# Patient Record
Sex: MALE | Race: WHITE | NOT HISPANIC OR LATINO | URBAN - METROPOLITAN AREA
[De-identification: names, ages, dates, MRNs, and addresses within clinical notes are randomized per-mention and may not be internally consistent; named-entity substitution may affect disease eponyms.]

---

## 2020-09-27 ENCOUNTER — OFFICE VISIT (OUTPATIENT)
Dept: URGENT CARE | Facility: CLINIC | Age: 40
End: 2020-09-27
Payer: COMMERCIAL

## 2020-09-27 VITALS
HEIGHT: 72 IN | RESPIRATION RATE: 18 BRPM | WEIGHT: 190 LBS | BODY MASS INDEX: 25.73 KG/M2 | OXYGEN SATURATION: 98 % | TEMPERATURE: 97 F | SYSTOLIC BLOOD PRESSURE: 127 MMHG | DIASTOLIC BLOOD PRESSURE: 78 MMHG | HEART RATE: 97 BPM

## 2020-09-27 DIAGNOSIS — Z23 IMMUNIZATION DUE: Primary | ICD-10-CM

## 2020-09-27 DIAGNOSIS — T14.8XXA PUNCTURE WOUND: ICD-10-CM

## 2020-09-27 PROCEDURE — 90471 IMMUNIZATION ADMIN: CPT | Mod: S$GLB,,, | Performed by: EMERGENCY MEDICINE

## 2020-09-27 PROCEDURE — 90471 TDAP VACCINE GREATER THAN OR EQUAL TO 7YO IM: ICD-10-PCS | Mod: S$GLB,,, | Performed by: EMERGENCY MEDICINE

## 2020-09-27 PROCEDURE — 90715 TDAP VACCINE 7 YRS/> IM: CPT | Mod: S$GLB,,, | Performed by: EMERGENCY MEDICINE

## 2020-09-27 PROCEDURE — 99203 OFFICE O/P NEW LOW 30 MIN: CPT | Mod: 25,S$GLB,, | Performed by: NURSE PRACTITIONER

## 2020-09-27 PROCEDURE — 99203 PR OFFICE/OUTPT VISIT, NEW, LEVL III, 30-44 MIN: ICD-10-PCS | Mod: 25,S$GLB,, | Performed by: NURSE PRACTITIONER

## 2020-09-27 PROCEDURE — 90715 TDAP VACCINE GREATER THAN OR EQUAL TO 7YO IM: ICD-10-PCS | Mod: S$GLB,,, | Performed by: EMERGENCY MEDICINE

## 2020-09-27 RX ORDER — LITHIUM CARBONATE 300 MG
300 TABLET ORAL DAILY
COMMUNITY
Start: 2020-08-25 | End: 2024-04-01

## 2020-09-27 RX ORDER — PANTOPRAZOLE SODIUM 40 MG/1
TABLET, DELAYED RELEASE ORAL
COMMUNITY
Start: 2020-09-25 | End: 2024-04-01

## 2020-09-27 RX ORDER — BUPROPION HYDROCHLORIDE 300 MG/1
300 TABLET ORAL DAILY
COMMUNITY
Start: 2020-08-25

## 2020-09-27 RX ORDER — MUPIROCIN 20 MG/G
OINTMENT TOPICAL 2 TIMES DAILY
Qty: 1 TUBE | Refills: 0 | Status: SHIPPED | OUTPATIENT
Start: 2020-09-27 | End: 2020-10-07

## 2020-09-27 NOTE — PROGRESS NOTES
Subjective:       Patient ID: Rafael Smith is a 40 y.o. male.    Vitals:  height is 6' (1.829 m) and weight is 86.2 kg (190 lb). His temperature is 97 °F (36.1 °C). His blood pressure is 127/78 and his pulse is 97. His respiration is 18 and oxygen saturation is 98%.     Chief Complaint: Puncture Wound    Patient states very light puncture from a august shelf at the bottom of AC unit  States he scraped it and needs tdap updated      Constitution: Negative for chills, fatigue and fever.   HENT: Negative for congestion and sore throat.    Neck: Negative for painful lymph nodes.   Cardiovascular: Negative for chest pain and leg swelling.   Eyes: Negative for double vision and blurred vision.   Respiratory: Negative for cough and shortness of breath.    Gastrointestinal: Negative for nausea, vomiting and diarrhea.   Genitourinary: Negative for dysuria, frequency and urgency.   Musculoskeletal: Negative for joint pain, joint swelling, muscle cramps and muscle ache.   Skin: Positive for puncture wound. Negative for color change, pale, rash and erythema.   Allergic/Immunologic: Negative for seasonal allergies.   Neurological: Negative for dizziness, history of vertigo, light-headedness, passing out and headaches.   Hematologic/Lymphatic: Negative for swollen lymph nodes, easy bruising/bleeding and history of blood clots. Does not bruise/bleed easily.   Psychiatric/Behavioral: Negative for nervous/anxious, sleep disturbance and depression. The patient is not nervous/anxious.        Objective:      Physical Exam   Constitutional: He is oriented to person, place, and time. He appears well-developed.   HENT:   Head: Normocephalic and atraumatic. Head is without abrasion, without contusion and without laceration.   Ears:   Right Ear: External ear normal.   Left Ear: External ear normal.   Nose: Nose normal.   Mouth/Throat: Oropharynx is clear and moist and mucous membranes are normal.   Eyes: Pupils are equal, round, and  reactive to light. Conjunctivae, EOM and lids are normal.   Neck: Trachea normal, full passive range of motion without pain and phonation normal. Neck supple.   Cardiovascular: Normal rate, regular rhythm and normal heart sounds.   Pulmonary/Chest: Effort normal and breath sounds normal. No stridor. No respiratory distress.   Musculoskeletal: Normal range of motion.   Neurological: He is alert and oriented to person, place, and time.   Skin: Skin is warm, dry, intact and no rash. Capillary refill takes less than 2 seconds. abrasion, burn, bruising, erythema and ecchymosis     Psychiatric: His speech is normal and behavior is normal. Judgment and thought content normal.   Nursing note and vitals reviewed.        Assessment:       1. Immunization due    2. Puncture wound        Plan:         Immunization due  -     (In Office Administered) Tdap Vaccine    Puncture wound  -     mupirocin (BACTROBAN) 2 % ointment; Apply topically 2 (two) times daily. Apply to the affected area for 10 days  Dispense: 1 Tube; Refill: 0           Patient Instructions     You must understand that you've received an Urgent Care treatment only and that you may be released before all your medical problems are known or treated. You, the patient, will arrange for follow up care as instructed.  If your condition worsens we recommend that you receive another evaluation at the emergency room immediately or contact your primary medical clinics after hours call service to discuss your concerns.  Please return here or go to the Emergency Department for any concerns or worsening of condition.      Puncture Wound       A puncture wound occurs when a pointed object pushes into the skin. It may go into the tissues below the skin, including fat and muscle. This type of wound is narrow and deep and can be hard to clean. Because of this, puncture wounds are at high risk for becoming infected.  X-rays may be done to check the wound for objects stuck under the  skin. Your may also need a tetanus shot. This is given if you are not up to date on this vaccination and the object that caused the wound may lead to tetanus.  Home care  · Your healthcare provider may prescribe an antibiotic. This is to help prevent infection. Follow all instructions for taking this medicine. Take the medicine every day until it is gone or you are told to stop. You should not have any left over.  · The healthcare provider may prescribe medicines for pain. Follow instructions for taking them.  · You can take acetaminophen or ibuprofen for pain, unless you were given a different pain medicine to use.   · Follow the healthcare providers instructions on how to care for the wound.  · Keep the wound clean and dry. Do not get the wound wet until you are told it is okay to do so. If the area gets wet, gently pat it dry with a clean cloth. Replace the wet bandage with a dry one.  · If a bandage was applied and it becomes wet or dirty, replace it. Otherwise, leave it in place for the first 24 hours.  · Once you can get the wound wet, you may shower as usual but do not soak the wound in water (no tub baths or swimming)  · Even with proper treatment, a puncture wound may become infected. Check the wound daily for signs of infection listed below.  Follow-up care  Follow up with your healthcare provider as advised.   When to seek medical advice  Call your healthcare provider right away if any of these occur:  · Signs of infection, including:  ¨ Increasing redness or swelling around the wound  ¨ Increased warmth of the wound  ¨ Worsening pain  ¨ Red streaking lines away from the wound  ¨ Draining pus  · Fever of 100.4°F (38.ºC) or higher or as directed by your healthcare provider  · Wound changes colors  · Numbness around the wound  · Decreased movement around the injured area  Date Last Reviewed: 8/24/2015  © 3442-8581 [employer]. [address] [place] [address], [place] [address]. All rights reserved.  This information is not intended as a substitute for professional medical care. Always follow your healthcare professional's instructions.

## 2020-09-27 NOTE — PATIENT INSTRUCTIONS
You must understand that you've received an Urgent Care treatment only and that you may be released before all your medical problems are known or treated. You, the patient, will arrange for follow up care as instructed.  If your condition worsens we recommend that you receive another evaluation at the emergency room immediately or contact your primary medical clinics after hours call service to discuss your concerns.  Please return here or go to the Emergency Department for any concerns or worsening of condition.      Puncture Wound       A puncture wound occurs when a pointed object pushes into the skin. It may go into the tissues below the skin, including fat and muscle. This type of wound is narrow and deep and can be hard to clean. Because of this, puncture wounds are at high risk for becoming infected.  X-rays may be done to check the wound for objects stuck under the skin. Your may also need a tetanus shot. This is given if you are not up to date on this vaccination and the object that caused the wound may lead to tetanus.  Home care  · Your healthcare provider may prescribe an antibiotic. This is to help prevent infection. Follow all instructions for taking this medicine. Take the medicine every day until it is gone or you are told to stop. You should not have any left over.  · The healthcare provider may prescribe medicines for pain. Follow instructions for taking them.  · You can take acetaminophen or ibuprofen for pain, unless you were given a different pain medicine to use.   · Follow the healthcare providers instructions on how to care for the wound.  · Keep the wound clean and dry. Do not get the wound wet until you are told it is okay to do so. If the area gets wet, gently pat it dry with a clean cloth. Replace the wet bandage with a dry one.  · If a bandage was applied and it becomes wet or dirty, replace it. Otherwise, leave it in place for the first 24 hours.  · Once you can get the wound wet,  you may shower as usual but do not soak the wound in water (no tub baths or swimming)  · Even with proper treatment, a puncture wound may become infected. Check the wound daily for signs of infection listed below.  Follow-up care  Follow up with your healthcare provider as advised.   When to seek medical advice  Call your healthcare provider right away if any of these occur:  · Signs of infection, including:  ¨ Increasing redness or swelling around the wound  ¨ Increased warmth of the wound  ¨ Worsening pain  ¨ Red streaking lines away from the wound  ¨ Draining pus  · Fever of 100.4°F (38.ºC) or higher or as directed by your healthcare provider  · Wound changes colors  · Numbness around the wound  · Decreased movement around the injured area  Date Last Reviewed: 8/24/2015  © 9975-3619 [employer]. [address] [place] [address], [place] [address]. All rights reserved. This information is not intended as a substitute for professional medical care. Always follow your healthcare professional's instructions.

## 2021-02-20 ENCOUNTER — IMMUNIZATION (OUTPATIENT)
Dept: PRIMARY CARE CLINIC | Facility: CLINIC | Age: 41
End: 2021-02-20
Payer: COMMERCIAL

## 2021-02-20 DIAGNOSIS — Z23 NEED FOR VACCINATION: Primary | ICD-10-CM

## 2021-02-20 PROCEDURE — 0001A PR IMMUNIZ ADMIN, SARS-COV-2 COVID-19 VACC, 30MCG/0.3ML, 1ST DOSE: CPT | Mod: PBBFAC | Performed by: INTERNAL MEDICINE

## 2021-02-20 PROCEDURE — 91300 PR SARS-COV- 2 COVID-19 VACCINE, NO PRSV, 30MCG/0.3ML, IM: CPT | Mod: PBBFAC | Performed by: INTERNAL MEDICINE

## 2021-02-20 RX ADMIN — RNA INGREDIENT BNT-162B2 0.3 ML: 0.23 INJECTION, SUSPENSION INTRAMUSCULAR at 04:02

## 2021-03-13 ENCOUNTER — IMMUNIZATION (OUTPATIENT)
Dept: PRIMARY CARE CLINIC | Facility: CLINIC | Age: 41
End: 2021-03-13
Payer: COMMERCIAL

## 2021-03-13 DIAGNOSIS — Z23 NEED FOR VACCINATION: Primary | ICD-10-CM

## 2021-03-13 PROCEDURE — 0002A PR IMMUNIZ ADMIN, SARS-COV-2 COVID-19 VACC, 30MCG/0.3ML, 2ND DOSE: ICD-10-PCS | Mod: CV19,S$GLB,, | Performed by: INTERNAL MEDICINE

## 2021-03-13 PROCEDURE — 91300 PR SARS-COV- 2 COVID-19 VACCINE, NO PRSV, 30MCG/0.3ML, IM: CPT | Mod: S$GLB,,, | Performed by: INTERNAL MEDICINE

## 2021-03-13 PROCEDURE — 91300 PR SARS-COV- 2 COVID-19 VACCINE, NO PRSV, 30MCG/0.3ML, IM: ICD-10-PCS | Mod: S$GLB,,, | Performed by: INTERNAL MEDICINE

## 2021-03-13 PROCEDURE — 0002A PR IMMUNIZ ADMIN, SARS-COV-2 COVID-19 VACC, 30MCG/0.3ML, 2ND DOSE: CPT | Mod: CV19,S$GLB,, | Performed by: INTERNAL MEDICINE

## 2021-03-13 RX ADMIN — Medication 0.3 ML: at 12:03

## 2022-04-11 ENCOUNTER — OFFICE VISIT (OUTPATIENT)
Dept: OTOLARYNGOLOGY | Facility: CLINIC | Age: 42
End: 2022-04-11
Payer: COMMERCIAL

## 2022-04-11 VITALS
HEIGHT: 72 IN | WEIGHT: 185 LBS | TEMPERATURE: 98 F | SYSTOLIC BLOOD PRESSURE: 113 MMHG | DIASTOLIC BLOOD PRESSURE: 70 MMHG | HEART RATE: 62 BPM | BODY MASS INDEX: 25.06 KG/M2

## 2022-04-11 DIAGNOSIS — Z87.898 HISTORY OF NASAL CONGESTION: ICD-10-CM

## 2022-04-11 DIAGNOSIS — H93.299 MISOPHONIA: ICD-10-CM

## 2022-04-11 DIAGNOSIS — K14.1 GEOGRAPHIC TONGUE: ICD-10-CM

## 2022-04-11 DIAGNOSIS — Z87.19 HISTORY OF GASTROESOPHAGEAL REFLUX (GERD): ICD-10-CM

## 2022-04-11 PROCEDURE — 3078F PR MOST RECENT DIASTOLIC BLOOD PRESSURE < 80 MM HG: ICD-10-PCS | Mod: CPTII,S$GLB,, | Performed by: OTOLARYNGOLOGY

## 2022-04-11 PROCEDURE — 1160F RVW MEDS BY RX/DR IN RCRD: CPT | Mod: CPTII,S$GLB,, | Performed by: OTOLARYNGOLOGY

## 2022-04-11 PROCEDURE — 1159F PR MEDICATION LIST DOCUMENTED IN MEDICAL RECORD: ICD-10-PCS | Mod: CPTII,S$GLB,, | Performed by: OTOLARYNGOLOGY

## 2022-04-11 PROCEDURE — 3008F PR BODY MASS INDEX (BMI) DOCUMENTED: ICD-10-PCS | Mod: CPTII,S$GLB,, | Performed by: OTOLARYNGOLOGY

## 2022-04-11 PROCEDURE — 3078F DIAST BP <80 MM HG: CPT | Mod: CPTII,S$GLB,, | Performed by: OTOLARYNGOLOGY

## 2022-04-11 PROCEDURE — 1160F PR REVIEW ALL MEDS BY PRESCRIBER/CLIN PHARMACIST DOCUMENTED: ICD-10-PCS | Mod: CPTII,S$GLB,, | Performed by: OTOLARYNGOLOGY

## 2022-04-11 PROCEDURE — 3074F SYST BP LT 130 MM HG: CPT | Mod: CPTII,S$GLB,, | Performed by: OTOLARYNGOLOGY

## 2022-04-11 PROCEDURE — 99204 PR OFFICE/OUTPT VISIT, NEW, LEVL IV, 45-59 MIN: ICD-10-PCS | Mod: S$GLB,,, | Performed by: OTOLARYNGOLOGY

## 2022-04-11 PROCEDURE — 3074F PR MOST RECENT SYSTOLIC BLOOD PRESSURE < 130 MM HG: ICD-10-PCS | Mod: CPTII,S$GLB,, | Performed by: OTOLARYNGOLOGY

## 2022-04-11 PROCEDURE — 99204 OFFICE O/P NEW MOD 45 MIN: CPT | Mod: S$GLB,,, | Performed by: OTOLARYNGOLOGY

## 2022-04-11 PROCEDURE — 1159F MED LIST DOCD IN RCRD: CPT | Mod: CPTII,S$GLB,, | Performed by: OTOLARYNGOLOGY

## 2022-04-11 PROCEDURE — 3008F BODY MASS INDEX DOCD: CPT | Mod: CPTII,S$GLB,, | Performed by: OTOLARYNGOLOGY

## 2022-04-11 NOTE — PROGRESS NOTES
Subjective:       Patient ID: Rafael Smith is a 41 y.o. male.    Chief Complaint: Other (Mouth/tongue ulcers, misophonia.)    He is a new patient here today for evaluation of tongue lesions.  Has noted ulcers on and off for 10 or 15 years.  Lesions come and go, each clearing after a few days.  Perhaps some mild associated soreness, not much.  Not aware of any triggers or alleviating factors.  No other mucosal sites or rash or skin lesions.  No dysphagia or voice change.  Recently evaluated for food sensitivity and reports strong positive results to wheat and oats and other dietary ingredients.  Just began an elimination diet.  Reports previously drank alcohol excessively but not in the past 2 years.  Recent blood work showed elevated vitamin B levels.  States attributed to prior surgery for GERD.  No supplements or current daily vitamin.  Still occasional GERD, not bad.  Takes pantoprazole.  Nasal congestion after supine, better with breathe right strips and some head elevation.  Mouth care includes no mouth washes or whitening agents.  Uses mild tooth paste by [employer].  Does brush his tongue daily.  No lozenges.  No tobacco.  Also mentions history of misophonia since childhood.  Currently working on this with PCP, psychotherapist/CBT, audiologist.  States had audiogram at [facility] 3 months ago and normal.          Review of Systems     Constitutional: Negative for fever.      HENT: Positive for stuffy nose.  Negative for ear discharge, ear pain and hearing loss.      Respiratory:  Negative for cough and shortness of breath.      Cardiovascular:  Negative for chest pain.     Gastrointestinal:  Positive for acid reflux.     Neurological: Negative for dizziness.      Hematologic: Negative for swollen glands.                Objective:        Vitals:    04/11/22 1438   BP: 113/70   Pulse: 62   Temp: 98.1 °F (36.7 °C)     Body mass index is 25.09 kg/m².  Physical Exam  Constitutional:       General: He is not in acute  distress.     Appearance: He is well-developed.   HENT:      Head: Normocephalic and atraumatic.      Right Ear: Tympanic membrane, ear canal and external ear normal.      Left Ear: Tympanic membrane, ear canal and external ear normal.      Nose: No nasal deformity, mucosal edema or rhinorrhea.      Mouth/Throat:      Mouth: Mucous membranes are moist.      Pharynx: No pharyngeal swelling, oropharyngeal exudate or posterior oropharyngeal erythema.      Comments:   Superficial scalloping lateral dorsal tongue consistent with geographic tongue and soft to palpation with no nodules or masses.  Neck:      Vascular: No carotid bruit.      Trachea: Phonation normal.   Pulmonary:      Effort: Pulmonary effort is normal. No respiratory distress.   Musculoskeletal:      Cervical back: Neck supple.   Lymphadenopathy:      Cervical: No cervical adenopathy.   Skin:     General: Skin is warm and dry.   Neurological:      Mental Status: He is alert and oriented to person, place, and time.   Psychiatric:         Speech: Speech normal.         Behavior: Behavior normal.         Tests / Results:          Assessment:       1. Geographic tongue    2. History of nasal congestion    3. History of gastroesophageal reflux (GERD)    4. Misophonia        Plan:       Reviewed above and considerations and recommendations and answered questions.  Today's oral exam and the photos he provided are consistent with geographic tongue.  Factors potentially exacerbating tongue symptoms reviewed including minimize local trauma of tongue brushing, stay hydrated and avoid mouth breathing and mucosal dryness.  Recheck with flare up discussed.  Encouraged to follow-up misophonia with psychotherapist/CBT.

## 2024-01-10 DIAGNOSIS — K21.9 CHRONIC GERD: Primary | ICD-10-CM

## 2024-01-16 ENCOUNTER — CLINICAL SUPPORT (OUTPATIENT)
Dept: NUTRITION | Facility: CLINIC | Age: 44
End: 2024-01-16
Payer: COMMERCIAL

## 2024-01-16 DIAGNOSIS — Z71.3 NUTRITIONAL COUNSELING: ICD-10-CM

## 2024-01-16 DIAGNOSIS — Z71.3 DIETARY COUNSELING AND SURVEILLANCE: Primary | ICD-10-CM

## 2024-01-16 PROCEDURE — 97802 MEDICAL NUTRITION INDIV IN: CPT | Mod: 95,,, | Performed by: NUTRITIONIST

## 2024-01-16 NOTE — PATIENT INSTRUCTIONS
Name: Rafael Smith  Date: 1.16.2024  Nutrition protocol    Daily Energy Requirements:  Calories: 2400  Protein: ~180 grams  Carbohydrates: ~160 grams  Fats: ~115 grams Choose plant-based heart healthy fats  Total fluid: 93 oz + sweat loss  Limit added sugar to 20 grams or less per day (Note: 1 teaspoon of sugar = ~5 grams)    Goals:  Foods to avoid/limit that may trigger GERD symptoms:  Fried foods (Enjoy at most twice a month)  Mint   Chocolate   Caffeine   Spicy food  Acidic food  Garlic  Onions   High single macronutrient meals such as high fat, high protein, or high CHO meals  Hot or cold beverages  Tomatoes  Carbonated beverages  General recommendations for GERD:  Eat smaller more frequent meals throughout the day (Basically eat every 3 hours)  Avoid eating or drinking a large amount of fluid within 2 hours of laying down  Wear loose-fitting clothing  Weight loss may relieve some symptoms  Avoid chewing gum as it can introduce air into the stomach  Don't consume coffee on an empty stomach  Recommend getting an Airfryer  Exercise/movement: Start small by walking 20 minutes at least 5 days per week. Increase time gradually until you're able to do 45-60 minutes 5 days/week  Breakfast: 2 servings of carbohydrates = 30-40 grams + at least 30 grams lean protein/heart healthy fat  2 low sugar/high protein flavored Greek yogurt (see brand examples below) + ¾ cup Three Wishes cereal + ½ cup fruit    Snack: A snack is recommended if going >3-4 hours between meals      Lunch 1: 4 oz lean protein + any non-starchy veggies + up to 2 servings of carbohydrates = 30-40 grams  Examples of 2 servings of carbohydrates = ~30-40 grams:  1.5 cups cooked lentil pasta -or- 1 cup cooked whole wheat pasta; 2/3 cup cooked brown rice; 1 medium potato or sweet potato (5-6 oz in weight); 1 piece of fruit + 2 thin slices of bread; 2 slices 100-calorie bread; ½ cup corn + ½ cup cooked mashed potatoes; 1 cup cooked beans; ½ cup cooked  beans + 1/3 cup cooked brown rice, etc  The rest of your plate will consist of 4 oz lean protein + at least 1 cup cooked non-starchy veggies  Lunch Option Examples:  Salad: Unlimited non-starchy veggies + 4 oz lean protein + 2 salad add-ins (see notes below) + 1 cup fruit  [place]: 2 slices Evaristo's Killer 21 whole grains & seeds thin sliced bread + 4 oz freshly sliced turkey or ham + 1 tablespoon regular mayonnaise + 1 slice of cheese + non-starchy veggies of your choice  Spaghetti & meat sauce: 1 cup cooked lentil pasta + 4 oz 93/7 lean ground beef + low sugar red sauce (see brands below) + side of non-starchy veggies  Red Beans & Rice: ½ cup cooked beans + 1/3 cup cooked brown rice + additional 2 oz lean protein (ex: chicken sausage, ham, chicken, etc) + side of non-starchy veggies  Gumbo: 4 oz lean protein with gumbo (chicken sausage and chicken) over ½ cup cooked brown rice + non-starchy veggies  4 oz Airfried pork chops + ½ cup cooked starch of your choice  (Ex: 3 oz potato to make homemade Airfried French fries) + non-starchy veggies  Restaurants: See tips below, particularly the Pick 1 out of the 4 rule as well as info on Eat Fit restaurant partners  Fast Food  Convenient Options: Refer to Fueling Well On-The-Go Guide     2-3 hours post Lunch 1, have Lunch 2: 3-4 oz lean protein + up to 2 servings of carbs = 30-40 grams = 1 cup cooked starch    Snack: ~1 serving of carbohydrates = 15-25 grams + at least 20 grams lean protein/heart healthy fat + any non-starchy vegetables  Greek yogurt parfait: 5 oz 2% plain Greek yogurt + ½ cup fruit of your choice + ¼ cup low sugar granola   2 Flavored Greek Yogurts (see brand examples below) + ¼ cup nuts -or- low sugar granola  [place] balance break + piece of fruit  ¼ cup nuts + piece of fruit  Piece of fruit + 2 tablespoons regular nut butter  Protein Bar (see brand examples below) + piece of fruit -or- 1 serving of better-for-you sweet (see brand examples below)  1  cup high protein cereal with optional milk      Dinner: Lower carbohydrate à ~ 1 serving of carbohydrates = 15-20 grams = ~½ cup cooked whole grain starch -or- 1 piece of fruit  Remainder of your plate will consist of 5 oz lean protein + 1 cup or more non-starchy veggies cooked in ~1 tablespoon heart healthy fat (Avocado Oil, Extra Virgin Olive Oil)  Carb Swaps if Desired:  Hearts of Palm-Based 'Linguini'  'Riced' Cauliflower  'Riced' Broccoli  Cauliflower Mash   Spaghetti Squash  Zoodles  Spiralized Beets   Low Carb Breads (See brands below)  Think outside the box for low carb dishes:  Kabobs, stir sandoval made with riced cauliflower or riced broccoli, stuffed bell peppers with no rice, lettuce wraps, eggplant lasagna, shrimp etouffee made with riced cauliflower, request a sushi roll that contains raw fish to be made without rice, etc  Quest frozen pizza à low carb + high protein frozen pizza; Add additional side of non-starchy veggies      Optional Post-Dinner Sweet: Anything up to 200 calories  'Fun size'  See better-for-you sweet brands below      Restaurant Tips    Pick 1 out of the 3 Rule: Instead of eating bread/tortilla chips, an appetizer and dessert, choose just one to have with your entrée   Focus on lean cuts of protein: Refer to lean meat/meat substitutes page in meal planning guidebook  Select items grilled, baked, broiled, braised, poached, or roasted       For your heart health, avoid crispy, crunchy, breaded, paneed or stuffed items and items that are cream based, au gratin or buttered       Order sauces, dressings, and gravies on the side. This way you can add 1-2 tablespoons yourself. This helps with portion control      You can request double non-starchy vegetables instead of a starchy side dish. If the starchy side is something you love, consider splitting it with someone else at the table      Beverages: Order water with lemon, sparkling water, or unsweetened tea. Avoid sugary  beverages such as soft drinks, juices, and mixers   Deep fried foods: Enjoy no more than 2x/month         Dining Out   -[facility]   Designed to take the guesswork out of dining out healthfully, [facility] makes the healthy choice the easy choice  Visit www.[facility].com Eat Fit Cookbook  Craft: The Eat Fit Guide to Zero Proof Cocktails  Download the [facility] Eat [facility] jasen for free on your smartphone  List of all Eat Fit restaurant partners & dishes by location  Nutrition facts included for all Eat Fit dishes  200+ Eat Fit approved recipes  Eat Fit shopping guide + community wellness resources        Building A Better Smoothie   Protein: Choose one of the following protein options   Plain Greek Yogurt: ~8 oz Nonfat or 2%   Plant-Based Protein Powder: 2 scoops  Orgain   Sunwarrior   Garden of Life RAW   Truvani  Whey Protein Powder: 2 scoops  Garden of Life Sport Grass Fed Whey   Whey Natural  Collagen Peptides: ~8-10 tablespoons (4 tablespoons = 20 grams protein)  Vital Proteins Collagen Peptides, unflavored  Organic Grass Fed Collagen Peptides  Make it Sweet:   Add ~1-1.5 cups fresh -or- No Sugar Added  Unsweetened frozen fruit    Add your Veggies:   Instead of ice, you can use frozen plain cauliflower florets    Cauliflower helps with the detoxification process in our bodies, and it's virtually tasteless!   Greens: spinach or kale  Beets are a great addition to smoothies, especially paired with strawberries and lemon   Cucumbers and celery are refreshing ways to enhance nutrition and hydration within your smoothie   Healthy Fats: add one of the following plant-based fats:    Nut Butter: 1 tablespoon   Natural Peanut Butter   [place] Butter   Cashew Butter   Avocado (½ Munoz)   Avocado Oil  Extra Virgin Olive Oil: 1 tablespoon   Add a liquid to reach your desired consistency:   Unsweetened/No Sugar Added Plant-Based Milk Alternative:    [place], Hemp, Cashew or Coconut  Milk: Skim, Reduced  Fat  Healthy add-ins for an extra nutritional boost:   1 tablespoon Flaxseeds -or- Jorge Seeds          Ordering A Better-For-You Salad   Include a lean protein, any amount of non-starchy vegetables, and a small amount of fat   Order salad dressings on the side to better control portion sizes   Add ~2-3 tablespoons yourself to lightly coat   Pick 2-3 salad add-ins, each being ~2 tablespoons:   Dressing   Cheese   NutsSeeds   AvocadoGuacamole         Additional Nutrition Tips      -Recommendations for physical activity to stay healthy:   Aim for at least 150 minutes per week of moderate-intensity aerobic activity   Aim for at least 2 days per week of muscle-strengthening activity   Post weight/resistance training: Be sure to consume at least 20 grams protein within 1-hour post workout   Reminder: Slow and steady wins the race. Start small and let the healthy changes grow    - Blake #7 Rule: Guidelines for food brands found in the aisles of grocery stores   Look for brands that contain < 7 grams added sugar and  > 7 grams protein      -Frozen Meal Guidelines:    ~45 grams or less carbohydrates & at least 20 grams protein   Note: If you find a brand you enjoy that doesn't provide 20 grams protein, you can add leftover lean protein to the frozen meal   See notes below for several brand examples   Refer to the Eat Fit Shopping Guide for additional brand specific recommendations      -Portion Control:   Measure and/or weigh your recommended (cooked) portions when you start your meal plan   See what each portion looks like on your plate, then eyeball portions for future meals and snacks      -Limit the following as these can be inflammatory to our body and make us feel less energized:   Added sugar   White, refined, processed carbohydrates   Deep fried foods     -If you're having trouble finding a specific food brand, try looking on the brands website. Most companies have a 'store   find a store' on their  website         Better-For-You Food Brands      - Whole Grain Breads:   Evaristo's Killer Bread - 21 Whole Grain & Seeds, PowerSeed   Thin sliced -or- Regular Slice   Recommended to keep refrigerated   Look for 100% whole wheat/whole grain bread options  Low Carb Breads: Freezer Aisle  Base Culture 7 Nut & Seed   [place]aut: Seeded Low Carb Keto Bread  Unbun: Low Carb Buns, Breads & Bagels     -Whole Grain Tortillas  Wraps:   Corn    Siete: Grain Free Tortillas  Paul Zoila 100% Whole Wheat Tortillas  [place] Tortilla Factory Low Carb Whole Wheat Tortillas  [place] Carb Balance Whole Wheat Tortillas     -High Protein Pasta's:   Banza Chickpea Pasta's:    Mac-n-Cheese   Pasta's - All Varieties    Red lentil  Yellow Lentil Pasta   Black Bean Pasta   Edamame-Based Pasta   Barilla: Chickpea & Red Lentil Pasta     -Rice:   Brown Rice  10-minute boil in a bag is great for convenience  Banza Chickpea Rice   RightRice: Made from Vegetables  RightRice   Risotto      -Cold Cereals:   Kinza Crunch Keto-Friendly Cereals   :ratio KETO Friendly Cereal   Iwon Organic Protein Crunchies   Three Wishes   Premier Protein    Special K PROTEIN  Magic Spoon Grain-Free Cereals       -Whole Grain Chips:   SunChips   Beanito's  [place] Bean Chips   PopCorners FLEX Protein Crisps     Lesser Evil: Power Curls  Siete Grain Free Tortilla Chips     - Protein Chips:   iwon Organics Protein Stix  Protein Puffs   Quest   [place] Life Tortilla Style Chips     -Whole Grain Crackers:   Triscuit Thin Crisps  [place] Nut Thins   's Gone Crackers   Crunchmaster Protein Sea Salt Cracker   Whisps: Parmesan Crisps     -Protein Bars:   [place] Protein Chewy    KIND Protein   Rx Bar   Rx Layers Bar  Bulletproof Collagen Protein Bar  Oatmega Bars   :ratio KETO Friendly Crunchy Bar      -Ready-to-Drink Protein Drinks:   Fairlife CORE POWER Elite 42-gram Protein Drink   Fairlife 30-gram Protein Drink   Iconic Grass-Fed Protein Drink   Orgain  Clean Plant-Based Protein Drink   OWYN Plant-Based Protein Drink   KOIA Plant Powered Nutrition Drink  KOIA 'Keto' Protein Drink     -Jef's Natural Foods:  Heat and Eat Entrees    Located in refrigerated section of most grocery stores  Keto Cauli Mac-n-Cheese  Found at select [employer] locations in refrigerated section  - Flavored Greek Yogurt:   Oikos Pro 20g Protein Greek Yogurt  Oikos Triple Zero Greek Yogurt  :ratio 25g Protein Greek Yogurt  Two Good - All varieties       - Red Sauce in a Jar:   Dar & Nena's Heart Smart Sauce    Classico Original   Engine 2 Plant-Strong   Henderson's Homemade Red Sauces    -BBQ Sauce:    Stubb's All-Natural Bar-B-Q Sauce  Primal Kitchen Classic BBQ Sauce  2 Sister's BBQ Sauce      -Better-For-You Ice Cream  Ice Cream Bars:   Halo Top Ice Cream  Enlightened 'Light' Ice Cream   KETO: Pint Ice Cream Bars   Rebel Ice Cream  Yasso Frozen Greek Yogurt Bar     -Better-For-You-Cookies:  Hi-Key  Partake  Quest Chocolate Chip Protein Cookie    -Swerve Sweetener à0-calorie plant-based sweetener that is best for baking. Visit https://swervesweet.com/ for recipes    -Instead of using regular sugar in your coffee and tea, try one of the following 0-calorie plant-based sweeteners:  Allulose  Monk Fruit  Truvia  Pure Via    -Emerald's Sweets: 70% Cocoa Dark Chocolate    -Collagen Peptides:  Great pantry staple: can be added to soups, hummus, coffee, etc to make higher protein   Favorite Brands:  Vital Proteins Collagen Peptides, Unflavored  Organic Grass Fed Collagen Peptides    -Supplements:   Vitamin D3: 1,000 international units per day       -Recommend getting your Vitamin D tested when you get labs done again  Fish Oil: Look for at least 1200 mg EPA + DHA per 2 capsules   Nordic Naturals: Ultimate Omega   B12: 1,000 mcg/day  Vitamin C: 1,000 mg/day  Magnesium: 250 mg/day  Calcium: 500 mg in the morning, 500 mg in the evening              To schedule or reschedule a nutrition follow-up appointment,  please call [facility] at [phone]

## 2024-01-16 NOTE — PROGRESS NOTES
Nutrition Assessment    Visit Type: Insurance initial  Session Time:  2 Hours  Reason for MNT visit: Pt in for education and nutrition counseling regarding Chronic GERD  The patient location is: his home  The chief complaint leading to consultation is: chronic GERD    Visit type: audiovisual    Face to Face time with patient: 90  120 minutes of total time spent on the encounter, which includes face to face time and non-face to face time preparing to see the patient (eg, review of tests), Obtaining and/or reviewing separately obtained history, Documenting clinical information in the electronic or other health record, Independently interpreting results (not separately reported) and communicating results to the patient/family/caregiver, or Care coordination (not separately reported).     Each patient to whom he or she provides medical services by telemedicine is:  (1) informed of the relationship between the physician and patient and the respective role of any other health care provider with respect to management of the patient; and (2) notified that he or she may decline to receive medical services by telemedicine and may withdraw from such care at any time.      Age: 43 y.o.  Wt:   Wt Readings from Last 1 Encounters:   04/11/22 83.9 kg (185 lb)     Ht:   Ht Readings from Last 1 Encounters:   04/11/22 6' (1.829 m)     BMI:   BMI Readings from Last 1 Encounters:   04/11/22 25.09 kg/m²       Client states:  He has been battling chronic GERD for some time now and is struggling with staying away from foods that triggers his acid reflux. 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       Medications    Prior to Admission medications    Medication Sig Start Date End Date Taking? Authorizing Provider   buPROPion (WELLBUTRIN XL) 300 MG 24 hr tablet Take 300  "mg by mouth once daily. 8/25/20   Provider, Historical   lithium (LITHOTAB) 300 mg tablet Take 300 mg by mouth once daily. 8/25/20   Provider, Historical   pantoprazole (PROTONIX) 40 MG tablet  9/25/20   Provider, Historical        Vitamins, Minerals, and/or Supplements:  none     Food Allergies or Intolerances:  NKFA     Social History    Marital status:  Single    Social History     Tobacco Use    Smoking status: Never    Smokeless tobacco: Never   Substance Use Topics    Alcohol use: Not on file     Current Alcohol use: none    Lab Reports   Reviewed and noted    No results found for: "UIVYRGNH95PH", "TSH", "FREET4", "CRP", "SEDRATE", "AST", "ALT", "GLUCOSE", "LIPIDTOTCHOL", "HDL", "LDLCALC", "TRIG", "HGBA1C"      BP Readings from Last 1 Encounters:   04/11/22 113/70        24-hour Recall    Food choices (After Midnight)  Patient eating midnight to 4am: (P) Never                  Food choices (Early Morning)  Patient eats 4am to 8am: (P) Every day   Home cooked meals (4am - 8am): (P) Yogurt with fruit and cereal              Food choices (Morning)  Patient eats 8am to noon: (P) Every day   Home cooked meals (8am - noon): (P) Red bean, gumbo, pasta, leftovers             Food choices (Afternoon)  Patient eats noon to 4pm: (P) Several times a week   Home cooked meals (Noon - 4pm): (P) Fruit   Snacks (Noon to 4pm): (P) Sweet, like cookies     Food choices (Evening)   Patient eats 4pm to 8pm: (P) Every day   Home cooked meals (4pm - 8pm): (P) Red beans, pasta, gumbo   Fast food meals (4pm - 8pm): (P) Fried chicken, pizza   Snacks (4pm - 8pm): (P) Dessert like ice cream, cookie, cake     Food choices (Late evening)  R [facility] PEQ NUTRITION HOW OFTEN EATING LATE EVENING: (P) Once or twice a week                  Beverages  How much water consumed (per day?): (P) 6   Cups of milk consumed (per day?): (P) 1   Types of milk: (P) Whole milk   Cups of  juice consumed (per day?): (P) 0   Number of supplement shakes (per day?): " (P) 0       Number of cups of coffee (per day?): (P) 0           Cups of soda consumed (per day?): (P) 0   Other non-alcoholic beverages consumed (per day?): (P) 0          LIFESTYLE FACTORS    Dinning out: 1-2 x per month    Meal preparation/shopping: Who does the grocery shopping:: (P) Mother in law Who prepares the meals: (P) Mother in law     Sleep: good    Stress Level: moderate    Exercise Regimen: Sedentary (little or no exercise)      Diagnosis    Excessive energy intake related to eating large portions as evidenced by 24 hour recall and patient stating this.      Intervention    Estimated Energy Requirements:   Calories: 2400  Protein: ~180 grams  Carbohydrates: ~160 grams  Fats: ~115 grams Choose plant-based heart healthy fats  Total fluid: 93 oz + sweat loss  Limit added sugar to 20 grams or less per day (Note: 1 teaspoon of sugar = ~5 grams)    Recommendations & Goals:  Patient goals and recommendations are tailored to the specific patient's needs, readiness to change, lifestyle, culture, skills, resources, & abilities. Strategies to help achieve these nutrition-related goals were discussed which can include but are not limited to SMART goal setting & mindful eating.     Aim for a minimum of 7 hours sleep   Exercise 60 minutes most days  Eat breakfast within 1-2 hours of waking up  Try not to skip any meals or snacks, not going more than 3-4 hours without eating   At each meal and snack, try to include a source of fiber + lean protein + healthy fat     Written Materials Provided  These resources are intended to assist the patient in making it easier to choose recommended options when eating out & to identify better-for-you brands at the grocery store:    Meal Planning Guide with recommendations discussed along with portion sizes and a customized meal plan   Fueling Well On-the-Go Food Guide  Eat Fit Shopping Guide  Lifestyle Nutrition Meal Guide  RD contact information    Goals:  Foods to avoid/limit  that may trigger GERD symptoms:  Fried foods (Enjoy at most twice a month)  Mint   Chocolate   Caffeine   Spicy food  Acidic food  Garlic  Onions   High single macronutrient meals such as high fat, high protein, or high CHO meals  Hot or cold beverages  Tomatoes  Carbonated beverages  General recommendations for GERD:  Eat smaller more frequent meals throughout the day (Basically eat every 3 hours)  Avoid eating or drinking a large amount of fluid within 2 hours of laying down  Wear loose-fitting clothing  Weight loss may relieve some symptoms  Avoid chewing gum as it can introduce air into the stomach  Don't consume coffee on an empty stomach  Recommend getting an Airfryer  Exercise/movement: Start small by walking 20 minutes at least 5 days per week. Increase time gradually until you're able to do 45-60 minutes 5 days/week      Monitoring/Evaluation    Monitor the following:  Weight  Sleep  Stress Management  Movement  Nutrient intake in reference to meal plan    Communicated with healthcare provider and documented plan for referral to appropriate agency/healthcare provider as needed    Supervising Physician: Ted Hector MD    Patient motivation, anticipated barriers, expected compliance: Patient is motivated and has verbalized understanding and intent to comply.     Comprehension: fair     Follow-up: 2-3 months or prn

## 2024-01-31 ENCOUNTER — TELEPHONE (OUTPATIENT)
Dept: GASTROENTEROLOGY | Facility: CLINIC | Age: 44
End: 2024-01-31
Payer: COMMERCIAL

## 2024-01-31 NOTE — TELEPHONE ENCOUNTER
----- Message from Monique Park RN sent at 1/31/2024  9:40 AM CST -----    ----- Message -----  From: Johanny Merino  Sent: 1/31/2024   9:38 AM CST  To: Girma Cabral,    I have pt being referred for Chronic Gerd.  I have scanned the referral /records in to media mgr within Epic. Please review and contact for scheduling,thanks!           Johanny DELGADILLO   [facility]

## 2024-02-02 ENCOUNTER — TELEPHONE (OUTPATIENT)
Dept: GASTROENTEROLOGY | Facility: CLINIC | Age: 44
End: 2024-02-02
Payer: COMMERCIAL

## 2024-02-02 NOTE — TELEPHONE ENCOUNTER
----- Message from Kaye Chan sent at 2/2/2024  3:04 PM CST -----  Regarding: Missed Call  Contact: [phone]  CONSULT/ADVISORY    Name of Caller:  SCARLETT NICOLE [[id number]]    Contact Preference: [phone]    Nature of Call:  Pt is returning a missed call from [facility] on 01/31/2024.  Please call.

## 2024-04-01 ENCOUNTER — OFFICE VISIT (OUTPATIENT)
Dept: GASTROENTEROLOGY | Facility: CLINIC | Age: 44
End: 2024-04-01
Payer: COMMERCIAL

## 2024-04-01 ENCOUNTER — TELEPHONE (OUTPATIENT)
Dept: GASTROENTEROLOGY | Facility: CLINIC | Age: 44
End: 2024-04-01
Payer: COMMERCIAL

## 2024-04-01 ENCOUNTER — LAB VISIT (OUTPATIENT)
Dept: LAB | Facility: HOSPITAL | Age: 44
End: 2024-04-01
Attending: INTERNAL MEDICINE
Payer: COMMERCIAL

## 2024-04-01 ENCOUNTER — TELEPHONE (OUTPATIENT)
Dept: ENDOSCOPY | Facility: HOSPITAL | Age: 44
End: 2024-04-01
Payer: COMMERCIAL

## 2024-04-01 VITALS
WEIGHT: 199.75 LBS | DIASTOLIC BLOOD PRESSURE: 70 MMHG | SYSTOLIC BLOOD PRESSURE: 113 MMHG | BODY MASS INDEX: 27.06 KG/M2 | HEIGHT: 72 IN | HEART RATE: 62 BPM

## 2024-04-01 DIAGNOSIS — K21.9 GASTROESOPHAGEAL REFLUX DISEASE, UNSPECIFIED WHETHER ESOPHAGITIS PRESENT: ICD-10-CM

## 2024-04-01 DIAGNOSIS — R10.33 PERIUMBILICAL ABDOMINAL PAIN: ICD-10-CM

## 2024-04-01 DIAGNOSIS — Z98.890 HISTORY OF NISSEN FUNDOPLICATION: ICD-10-CM

## 2024-04-01 DIAGNOSIS — R10.12 LEFT UPPER QUADRANT ABDOMINAL PAIN: ICD-10-CM

## 2024-04-01 DIAGNOSIS — Z12.11 SCREENING FOR COLON CANCER: ICD-10-CM

## 2024-04-01 DIAGNOSIS — R10.12 LEFT UPPER QUADRANT ABDOMINAL PAIN: Primary | ICD-10-CM

## 2024-04-01 LAB
25(OH)D3+25(OH)D2 SERPL-MCNC: 38 NG/ML (ref 30–96)
ALBUMIN SERPL BCP-MCNC: 4.4 G/DL (ref 3.5–5.2)
ALP SERPL-CCNC: 60 U/L (ref 55–135)
ALT SERPL W/O P-5'-P-CCNC: 29 U/L (ref 10–44)
ANION GAP SERPL CALC-SCNC: 8 MMOL/L (ref 8–16)
AST SERPL-CCNC: 29 U/L (ref 10–40)
BASOPHILS # BLD AUTO: 0.03 K/UL (ref 0–0.2)
BASOPHILS NFR BLD: 0.6 % (ref 0–1.9)
BILIRUB SERPL-MCNC: 0.5 MG/DL (ref 0.1–1)
BUN SERPL-MCNC: 16 MG/DL (ref 6–20)
CALCIUM SERPL-MCNC: 9.7 MG/DL (ref 8.7–10.5)
CHLORIDE SERPL-SCNC: 104 MMOL/L (ref 95–110)
CO2 SERPL-SCNC: 26 MMOL/L (ref 23–29)
CREAT SERPL-MCNC: 1.1 MG/DL (ref 0.5–1.4)
DIFFERENTIAL METHOD BLD: NORMAL
EOSINOPHIL # BLD AUTO: 0.1 K/UL (ref 0–0.5)
EOSINOPHIL NFR BLD: 1.4 % (ref 0–8)
ERYTHROCYTE [DISTWIDTH] IN BLOOD BY AUTOMATED COUNT: 12.3 % (ref 11.5–14.5)
EST. GFR  (NO RACE VARIABLE): >60 ML/MIN/1.73 M^2
FERRITIN SERPL-MCNC: 25 NG/ML (ref 20–300)
GLUCOSE SERPL-MCNC: 96 MG/DL (ref 70–110)
HAV IGG SER QL IA: NORMAL
HAV IGM SERPL QL IA: NORMAL
HBV CORE IGM SERPL QL IA: NORMAL
HBV SURFACE AG SERPL QL IA: NORMAL
HCT VFR BLD AUTO: 42.5 % (ref 40–54)
HCV AB SERPL QL IA: NORMAL
HGB BLD-MCNC: 14 G/DL (ref 14–18)
IGA SERPL-MCNC: 114 MG/DL (ref 40–350)
IMM GRANULOCYTES # BLD AUTO: 0.01 K/UL (ref 0–0.04)
IMM GRANULOCYTES NFR BLD AUTO: 0.2 % (ref 0–0.5)
IRON SERPL-MCNC: 104 UG/DL (ref 45–160)
LIPASE SERPL-CCNC: 36 U/L (ref 4–60)
LYMPHOCYTES # BLD AUTO: 1.9 K/UL (ref 1–4.8)
LYMPHOCYTES NFR BLD: 36.9 % (ref 18–48)
MCH RBC QN AUTO: 29.7 PG (ref 27–31)
MCHC RBC AUTO-ENTMCNC: 32.9 G/DL (ref 32–36)
MCV RBC AUTO: 90 FL (ref 82–98)
MONOCYTES # BLD AUTO: 0.4 K/UL (ref 0.3–1)
MONOCYTES NFR BLD: 8.4 % (ref 4–15)
NEUTROPHILS # BLD AUTO: 2.7 K/UL (ref 1.8–7.7)
NEUTROPHILS NFR BLD: 52.5 % (ref 38–73)
NRBC BLD-RTO: 0 /100 WBC
PLATELET # BLD AUTO: 207 K/UL (ref 150–450)
PMV BLD AUTO: 9.5 FL (ref 9.2–12.9)
POTASSIUM SERPL-SCNC: 4.7 MMOL/L (ref 3.5–5.1)
PROT SERPL-MCNC: 6.7 G/DL (ref 6–8.4)
RBC # BLD AUTO: 4.72 M/UL (ref 4.6–6.2)
SATURATED IRON: 28 % (ref 20–50)
SODIUM SERPL-SCNC: 138 MMOL/L (ref 136–145)
TOTAL IRON BINDING CAPACITY: 376 UG/DL (ref 250–450)
TRANSFERRIN SERPL-MCNC: 254 MG/DL (ref 200–375)
VIT B12 SERPL-MCNC: 883 PG/ML (ref 210–950)
WBC # BLD AUTO: 5.12 K/UL (ref 3.9–12.7)

## 2024-04-01 PROCEDURE — 3078F DIAST BP <80 MM HG: CPT | Mod: CPTII,S$GLB,, | Performed by: INTERNAL MEDICINE

## 2024-04-01 PROCEDURE — 83690 ASSAY OF LIPASE: CPT | Performed by: INTERNAL MEDICINE

## 2024-04-01 PROCEDURE — 3074F SYST BP LT 130 MM HG: CPT | Mod: CPTII,S$GLB,, | Performed by: INTERNAL MEDICINE

## 2024-04-01 PROCEDURE — 86706 HEP B SURFACE ANTIBODY: CPT | Performed by: INTERNAL MEDICINE

## 2024-04-01 PROCEDURE — 36415 COLL VENOUS BLD VENIPUNCTURE: CPT | Performed by: INTERNAL MEDICINE

## 2024-04-01 PROCEDURE — 99999 PR PBB SHADOW E&M-EST. PATIENT-LVL III: CPT | Mod: PBBFAC,,, | Performed by: INTERNAL MEDICINE

## 2024-04-01 PROCEDURE — 80053 COMPREHEN METABOLIC PANEL: CPT | Performed by: INTERNAL MEDICINE

## 2024-04-01 PROCEDURE — 86790 VIRUS ANTIBODY NOS: CPT | Performed by: INTERNAL MEDICINE

## 2024-04-01 PROCEDURE — 86364 TISS TRNSGLTMNASE EA IG CLAS: CPT | Performed by: INTERNAL MEDICINE

## 2024-04-01 PROCEDURE — 82784 ASSAY IGA/IGD/IGG/IGM EACH: CPT | Performed by: INTERNAL MEDICINE

## 2024-04-01 PROCEDURE — 82607 VITAMIN B-12: CPT | Performed by: INTERNAL MEDICINE

## 2024-04-01 PROCEDURE — 83540 ASSAY OF IRON: CPT | Performed by: INTERNAL MEDICINE

## 2024-04-01 PROCEDURE — 3008F BODY MASS INDEX DOCD: CPT | Mod: CPTII,S$GLB,, | Performed by: INTERNAL MEDICINE

## 2024-04-01 PROCEDURE — 80074 ACUTE HEPATITIS PANEL: CPT | Performed by: INTERNAL MEDICINE

## 2024-04-01 PROCEDURE — 82306 VITAMIN D 25 HYDROXY: CPT | Performed by: INTERNAL MEDICINE

## 2024-04-01 PROCEDURE — 99204 OFFICE O/P NEW MOD 45 MIN: CPT | Mod: S$GLB,,, | Performed by: INTERNAL MEDICINE

## 2024-04-01 PROCEDURE — 82728 ASSAY OF FERRITIN: CPT | Performed by: INTERNAL MEDICINE

## 2024-04-01 PROCEDURE — 85025 COMPLETE CBC W/AUTO DIFF WBC: CPT | Performed by: INTERNAL MEDICINE

## 2024-04-01 RX ORDER — PHENOL/SODIUM PHENOLATE
AEROSOL, SPRAY (ML) MUCOUS MEMBRANE
Status: ON HOLD | COMMUNITY
End: 2024-05-21 | Stop reason: HOSPADM

## 2024-04-01 NOTE — TELEPHONE ENCOUNTER
"----- Message from Michael Gray MD sent at 2024  9:57 AM CDT -----  Procedure: EGD/Colonoscopy    Diagnosis: GERD, left upper quadrant pain, screening for colon cancer    Procedure Timin-12 weeks    #If within 4 weeks selected, please jt as high priority#    #If greater than 12 weeks, please select "5-12 weeks" and delay sending until 3 months prior to requested date#    Provider: Myself    Location: [address]    Additional Scheduling Information: No scheduling concerns    Prep Specifications:Standard prep    Is the patient taking a GLP-1 Agonist:no    Have you attached a patient to this message: yes   "

## 2024-04-01 NOTE — TELEPHONE ENCOUNTER
----- Message from Dori Greer sent at 4/1/2024  8:19 AM CDT -----  Regarding: Call Back  Contact: Pt [phone]  Pt is calling stating he is returning your call please call

## 2024-04-01 NOTE — Clinical Note
"Procedure: EGD/Colonoscopy  Diagnosis: GERD, left upper quadrant pain, screening for colon cancer  Procedure Timin-12 weeks  *If within 4 weeks selected, please jt as high priority*  *If greater than 12 weeks, please select "5-12 weeks" and delay sending until 3 months prior to requested date*  Provider: Myself  Location: [address]  Additional Scheduling Information: No scheduling concerns  Prep Specifications:Standard prep  Is the patient taking a GLP-1 Agonist:no  Have you attached a patient to this message: yes "

## 2024-04-01 NOTE — Clinical Note
GI MA team Please schedule patient for 2nd floor lab work today Please schedule patient across the street at the imaging center for CT enterography Please have patient see endoscopy schedulers today for EGD colonoscopy Return to GI clinic 3-4 months for follow-up

## 2024-04-01 NOTE — PROGRESS NOTES
GENERAL GI PATIENT INTAKE:    COVID symptoms in the last 7 days (runny nose, sore throat, congestion, cough, fever): No  PCP: No, Primary Doctor  If not PCP-  number given to establish [phone]: N/A    ALLERGIES REVIEWED:  Yes    CHIEF COMPLAINT:    Chief Complaint   Patient presents with    Gastroesophageal Reflux    EGD    Abdominal Pain       VITAL SIGNS:  /70   Pulse 62   Ht 6' (1.829 m)   Wt 90.6 kg (199 lb 11.8 oz)   BMI 27.09 kg/m²      Change in medical, surgical, family or social history: No      REVIEWED MEDICATION LIST RECONCILED INCLUDING ABOVE MEDS:  Yes

## 2024-04-01 NOTE — PROGRESS NOTES
[facility] Consultation Note    Reason for Consult:  The primary encounter diagnosis was Left upper quadrant abdominal pain. Diagnoses of Periumbilical abdominal pain, Gastroesophageal reflux disease, unspecified whether esophagitis present, History of Nissen fundoplication, and Screening for colon cancer were also pertinent to this visit.    PCP:   Marian, Primary Doctor   [address] [address] / [facility] [id number]    Referring MD:  Ramu Espinoza Md  [address] [facility] [address]  [place] [address]    Initial History of Present Illness (HPI):  This is a 43 y.o. male here for evaluation of longstanding heartburn symptoms underwent a Nissen fundoplication a few years ago at [facility] is been on PPIs often on for years he switches him up when he feels like he builds up a tolerance to him he has not taking his PPI 45-60 minutes before his 1st protein meal of the day breakfast so we did discuss this with him this is the best way for him to get maximal efficacy from his PPI he is currently taking omeprazole 20 mg once daily over-the-counter no dysphagia no odynophagia no blood in his stool will set him up for lab work today screening colonoscopy he says that he has been having abdominal pain often on left upper quadrant periumbilical he would like CT scan imaging as well as lab work orders and referrals have been placed.  No true unintentional weight loss no blood in his stool no family history of any GI malignancies no esophageal or stomach cancer no small-bowel cancer no colon cancer nobody with advanced colon adenomas polyps no FAP no attenuated FAP no MA P no George syndrome nobody with celiac sprue or inflammatory bowel disease nobody with liver disease or liver cancer nobody with pancreatitis or pancreatic cancer nobody with ovarian uterine kidney or bladder or ureter cancer.  No history of abdominal trauma.    Abdominal pain - as above  Reflux - as above  Dysphagia - no    Bowel habits - normal  GI bleeding - none  NSAID usage - none    Interval HPI 04/01/2024:  The patient's last visit with me was on Visit date not found.      ROS:  Constitutional: No fevers, chills, No weight loss  ENT:  As above, heartburn no dysphagia no odynophagia no hoarseness  CV: No chest pain, no palpitation  Pulm: No cough, No shortness of breath, no wheezing  Ophtho: No vision changes  GI: see HPI  Derm: No rash, no itching  Heme: No lymphadenopathy, No easy bruising  MSK: No significant arthritis  : No dysuria, No hematuria  Endo: No hot or cold intolerance  Neuro: No syncope, No seizure, no strokes  Psych: No uncontrolled anxiety, No uncontrolled depression, but history of anxiety and depression    Medical History:  has a past medical history of Anxiety, Depression, and GERD (gastroesophageal reflux disease).    Surgical History:  has a past surgical history that includes Gastric fundoplication (04/01/2021).    Family History: family history includes No Known Problems in his father and mother..     Social History:  reports that he has never smoked. He has never used smokeless tobacco.    Review of patient's allergies indicates:   Allergen Reactions    Aspirin        Medication List with Changes/Refills   Current Medications    BUPROPION (WELLBUTRIN XL) 300 MG 24 HR TABLET    Take 300 mg by mouth once daily.    OMEPRAZOLE 20 MG TBEC    Omeprazole   Discontinued Medications    LITHIUM (LITHOTAB) 300 MG TABLET    Take 300 mg by mouth once daily.    PANTOPRAZOLE (PROTONIX) 40 MG TABLET             Objective Findings:    Vital Signs:  /70   Pulse 62   Ht 6' (1.829 m)   Wt 90.6 kg (199 lb 11.8 oz)   BMI 27.09 kg/m²   Body mass index is 27.09 kg/m².    Physical Exam:  General Appearance: Well appearing in no acute distress  Eyes:    No scleral icterus  ENT:  No lesions or masses   Lungs: CTA bilaterally, no wheezes, no rhonchi, no rales  Heart:  S1, S2 normal, no murmurs heard  Abdomen:  Non  "distended, soft, no guarding, no rebound, no tenderness, no appreciated ascites, no bruits, no hepatosplenomegaly,  No CVA tenderness, no appreciated hernias, no Spencer sign, no McBurney point tenderness  Musculoskeletal:  No major joint deformities  Skin: No petechiae or rash on exposed skin areas  Neurologic:  Alert and oriented x4  Psychiatric:  Normal speech mentation and affect    Labs:  No results found for: "WBC", "HGB", "HCT", "PLT", "CHOL", "TRIG", "HDL", "LDLDIRECT", "ALT", "AST", "NA", "K", "CL", "CREATININE", "BUN", "CO2", "TSH", "PSA", "INR", "GLUF", "HGBA1C", "MICROALBUR"      Medical Decision Making:  Ppi talk given  Lab work talk given  CT scan talk given  EGD colonoscopy talk given      Assessment:  1. Left upper quadrant abdominal pain    2. Periumbilical abdominal pain    3. Gastroesophageal reflux disease, unspecified whether esophagitis present    4. History of Nissen fundoplication    5. Screening for colon cancer         Recommendations:  1. Lab work today  2. CT enterography for further evaluation of patient's abdominal pain  3. Referral to endoscopy schedulers for EGD for GERD left upper quadrant pain and screening colonoscopy  4. Return GI clinic 3 months for follow-up     Follow up in about 3 months (around 7/1/2024).      Order summary:  Orders Placed This Encounter    CT Enterography Abd_Pelvis With Contrast    Lipase    TISSUE TRANSGLUTAMINASE (TTG), IGA    IgA    CBC Auto Differential    Comprehensive Metabolic Panel    Iron and TIBC    Ferritin    Vitamin B12    Vitamin D    Hepatitis Panel, Acute    Hepatitis B Surface Antibody, Qual/Quant    HEPATITIS A ANTIBODY, IGG         Thank you so much for allowing me to participate in the care of Rafael DOOLEY Luis Gray MD    DISCLAIMER: This note was prepared with spigit voice recognition transcription software. Garbled syntax, mangled or inadvertent pronouns, and other bizarre constructions may be attributed to that software " system. While efforts were made to correct any mistakes made by this voice recognition program, some errors and/or omissions may remain in the note that were missed when the note was originally created.

## 2024-04-01 NOTE — PATIENT INSTRUCTIONS
"Procedure: EGD/Colonoscopy    Diagnosis: GERD, left upper quadrant pain, screening for colon cancer    Procedure Timin-12 weeks    *If within 4 weeks selected, please jt as high priority*    *If greater than 12 weeks, please select "5-12 weeks" and delay sending until 3 months prior to requested date*    Provider: Myself    Location: [address]    Additional Scheduling Information: No scheduling concerns    Prep Specifications:Standard prep    Is the patient taking a GLP-1 Agonist:no    Have you attached a patient to this message: yes   "

## 2024-04-01 NOTE — TELEPHONE ENCOUNTER
Seen patient in [place] office to schedule patient procedure patient request to be scheduled in mid July informed patient July schedule was not open yet and I would contact him once its open patient states he is available anytime after July 17 he would like an late morning appointment.

## 2024-04-04 LAB
HBV SURFACE AB SER QL IA: NEGATIVE
HBV SURFACE AB SERPL IA-ACNC: <3 MIU/ML
TTG IGA SER-ACNC: <0.2 U/ML

## 2024-04-07 DIAGNOSIS — Z23 ENCOUNTER FOR VACCINATION: Primary | ICD-10-CM

## 2024-04-08 ENCOUNTER — TELEPHONE (OUTPATIENT)
Dept: INFECTIOUS DISEASES | Facility: CLINIC | Age: 44
End: 2024-04-08
Payer: COMMERCIAL

## 2024-04-08 NOTE — TELEPHONE ENCOUNTER
Called patient to schedule Hep A and Hep B series.   No answer, left a voice message to call back.

## 2024-04-10 ENCOUNTER — PATIENT MESSAGE (OUTPATIENT)
Dept: GASTROENTEROLOGY | Facility: CLINIC | Age: 44
End: 2024-04-10
Payer: COMMERCIAL

## 2024-04-11 ENCOUNTER — HOSPITAL ENCOUNTER (OUTPATIENT)
Dept: RADIOLOGY | Facility: HOSPITAL | Age: 44
Discharge: HOME | End: 2024-04-11
Attending: INTERNAL MEDICINE
Payer: COMMERCIAL

## 2024-04-11 DIAGNOSIS — K21.9 GASTROESOPHAGEAL REFLUX DISEASE, UNSPECIFIED WHETHER ESOPHAGITIS PRESENT: ICD-10-CM

## 2024-04-11 DIAGNOSIS — Z98.890 HISTORY OF NISSEN FUNDOPLICATION: ICD-10-CM

## 2024-04-11 DIAGNOSIS — R10.12 LEFT UPPER QUADRANT ABDOMINAL PAIN: ICD-10-CM

## 2024-04-11 DIAGNOSIS — R10.33 PERIUMBILICAL ABDOMINAL PAIN: ICD-10-CM

## 2024-04-11 DIAGNOSIS — Z12.11 SCREENING FOR COLON CANCER: ICD-10-CM

## 2024-04-11 PROCEDURE — 74177 CT ABD & PELVIS W/CONTRAST: CPT | Mod: 26,,, | Performed by: RADIOLOGY

## 2024-04-11 PROCEDURE — 25500020 PHARM REV CODE 255: Performed by: INTERNAL MEDICINE

## 2024-04-11 PROCEDURE — A9698 NON-RAD CONTRAST MATERIALNOC: HCPCS | Performed by: INTERNAL MEDICINE

## 2024-04-11 PROCEDURE — 74177 CT ABD & PELVIS W/CONTRAST: CPT | Mod: TC

## 2024-04-11 RX ADMIN — IOHEXOL 100 ML: 350 INJECTION, SOLUTION INTRAVENOUS at 06:04

## 2024-04-11 RX ADMIN — BARIUM SULFATE 1350 ML: 1 SUSPENSION ORAL at 05:04

## 2024-04-12 ENCOUNTER — PATIENT MESSAGE (OUTPATIENT)
Dept: GASTROENTEROLOGY | Facility: CLINIC | Age: 44
End: 2024-04-12
Payer: COMMERCIAL

## 2024-04-12 DIAGNOSIS — K91.89 SLIPPED NISSEN FUNDOPLICATION: ICD-10-CM

## 2024-04-12 DIAGNOSIS — K44.9 HIATAL HERNIA: ICD-10-CM

## 2024-04-12 DIAGNOSIS — K21.9 GASTROESOPHAGEAL REFLUX DISEASE, UNSPECIFIED WHETHER ESOPHAGITIS PRESENT: Primary | ICD-10-CM

## 2024-04-12 NOTE — PROGRESS NOTES
Jody please refer Rafael to Dr. Davian Schmitt in general surgery for evaluation of his GERD symptoms and CT scan showing slipped hiatal hernia repair.  Referral placed.    Impression:    Slipped fundoplication.    No evidence of active inflammatory bowel disease.    Electronically signed by resident: Tavares Olivas  Date:                                            04/12/2024  Time:                                           07:28    Electronically signed by: Ronn Rivero MD  Date:                                            04/12/2024

## 2024-04-13 ENCOUNTER — PATIENT MESSAGE (OUTPATIENT)
Dept: GASTROENTEROLOGY | Facility: CLINIC | Age: 44
End: 2024-04-13
Payer: COMMERCIAL

## 2024-04-30 ENCOUNTER — PATIENT MESSAGE (OUTPATIENT)
Dept: GASTROENTEROLOGY | Facility: CLINIC | Age: 44
End: 2024-04-30
Payer: COMMERCIAL

## 2024-05-06 NOTE — TELEPHONE ENCOUNTER
Contacted the patient to schedule an endoscopy procedure(s) 5/6/24. The patient did not answer the call and left a voice message requesting a call back.

## 2024-05-09 ENCOUNTER — TELEPHONE (OUTPATIENT)
Dept: ENDOSCOPY | Facility: HOSPITAL | Age: 44
End: 2024-05-09
Payer: COMMERCIAL

## 2024-05-09 VITALS — HEIGHT: 72 IN | BODY MASS INDEX: 27.06 KG/M2 | WEIGHT: 199.75 LBS

## 2024-05-09 DIAGNOSIS — K21.9 GASTROESOPHAGEAL REFLUX DISEASE, UNSPECIFIED WHETHER ESOPHAGITIS PRESENT: Primary | ICD-10-CM

## 2024-05-09 DIAGNOSIS — R10.12 LEFT UPPER QUADRANT PAIN: ICD-10-CM

## 2024-05-09 RX ORDER — RABEPRAZOLE SODIUM 20 MG/1
20 TABLET, DELAYED RELEASE ORAL
COMMUNITY
Start: 2024-03-20

## 2024-05-09 NOTE — TELEPHONE ENCOUNTER
" Message from Michael Gray MD sent at 2024  9:57 AM CDT -----  Procedure: EGD/Colonoscopy     Diagnosis: GERD, left upper quadrant pain, screening for colon cancer     Procedure Timin-12 weeks     #If within 4 weeks selected, please jt as high priority#     #If greater than 12 weeks, please select "5-12 weeks" and delay sending until 3 months prior to requested date#     Provider: Myself     Location: [address]     Additional Scheduling Information: No scheduling concerns     Prep Specifications:Standard prep     Is the patient taking a GLP-1 Agonist:no     Have you attached a patient to this message: yes   "

## 2024-05-09 NOTE — TELEPHONE ENCOUNTER
Spoke to Rafael to schedule procedure(s) Upper Endoscopy (EGD)       Physician to perform procedure(s) Dr. SHARATH Gray  Date of Procedure (s) 5/21/24  Arrival Time 8:45 AM  Time of Procedure(s) 9:45 AM   Location of Procedure(s) [facility] 4th Floor  Type of Rx Prep sent to patient: Other  Instructions provided to patient via [facility]    Patient was informed on the following information and verbalized understanding. Screening questionnaire reviewed with patient and complete. If procedure requires anesthesia, a responsible adult needs to be present to accompany the patient home, patient cannot drive after receiving anesthesia. Appointment details are tentative, especially check-in time. Patient will receive a prep-op call 7 days prior to confirm check-in time for procedure. If applicable the patient should contact their pharmacy to verify Rx for procedure prep is ready for pick-up. Patient was advised to call the scheduling department at [phone] if pharmacy states no Rx is available. Patient was advised to call the endoscopy scheduling department if any questions or concerns arise.       Endoscopy Scheduling Department

## 2024-05-09 NOTE — TELEPHONE ENCOUNTER
Dr. Gray ,  Pt is wanting to proceed with EGD on 5/21 Patient does not think is cardiac related its digestive issue, pt declined to see cardiac prior to procedure   Please advise how to proceed

## 2024-05-15 ENCOUNTER — TELEPHONE (OUTPATIENT)
Dept: ENDOSCOPY | Facility: HOSPITAL | Age: 44
End: 2024-05-15
Payer: COMMERCIAL

## 2024-05-15 NOTE — TELEPHONE ENCOUNTER
Confirmed  egd appt for 5/21/24 with pt. Confirmed receipt instructions. Reviewed instructions pt denies taking blood thinning or glp1 medications.Confirmed ride home after procedure.Pt verbalized understanding

## 2024-05-16 ENCOUNTER — TELEPHONE (OUTPATIENT)
Dept: ENDOSCOPY | Facility: HOSPITAL | Age: 44
End: 2024-05-16
Payer: COMMERCIAL

## 2024-05-16 NOTE — TELEPHONE ENCOUNTER
his is to inform you that case with appointment  05/21/2024 got no prior authorization is required for the service requested. Therefore it is good to move forward with the following case. Keeping you posted on the same. Thanks !!

## 2024-05-16 NOTE — TELEPHONE ENCOUNTER
Ma received referral message stating pt is good to have procedure , please see note below or tele encounter from 5/16/24

## 2024-05-20 ENCOUNTER — ANESTHESIA EVENT (OUTPATIENT)
Dept: ENDOSCOPY | Facility: HOSPITAL | Age: 44
End: 2024-05-20
Payer: COMMERCIAL

## 2024-05-21 ENCOUNTER — ANESTHESIA (OUTPATIENT)
Dept: ENDOSCOPY | Facility: HOSPITAL | Age: 44
End: 2024-05-21
Payer: COMMERCIAL

## 2024-05-21 ENCOUNTER — PATIENT MESSAGE (OUTPATIENT)
Dept: GASTROENTEROLOGY | Facility: CLINIC | Age: 44
End: 2024-05-21

## 2024-05-21 ENCOUNTER — HOSPITAL ENCOUNTER (OUTPATIENT)
Facility: HOSPITAL | Age: 44
Discharge: HOME | End: 2024-05-21
Attending: INTERNAL MEDICINE | Admitting: INTERNAL MEDICINE
Payer: COMMERCIAL

## 2024-05-21 VITALS
TEMPERATURE: 98 F | DIASTOLIC BLOOD PRESSURE: 62 MMHG | RESPIRATION RATE: 16 BRPM | HEART RATE: 64 BPM | WEIGHT: 200 LBS | BODY MASS INDEX: 27.09 KG/M2 | HEIGHT: 72 IN | OXYGEN SATURATION: 97 % | SYSTOLIC BLOOD PRESSURE: 101 MMHG

## 2024-05-21 DIAGNOSIS — K21.9 GERD (GASTROESOPHAGEAL REFLUX DISEASE): ICD-10-CM

## 2024-05-21 PROCEDURE — 25000003 PHARM REV CODE 250: Performed by: NURSE ANESTHETIST, CERTIFIED REGISTERED

## 2024-05-21 PROCEDURE — 63600175 PHARM REV CODE 636 W HCPCS: Performed by: NURSE ANESTHETIST, CERTIFIED REGISTERED

## 2024-05-21 PROCEDURE — 88305 TISSUE EXAM BY PATHOLOGIST: CPT | Mod: 26,,, | Performed by: PATHOLOGY

## 2024-05-21 PROCEDURE — 43239 EGD BIOPSY SINGLE/MULTIPLE: CPT | Performed by: INTERNAL MEDICINE

## 2024-05-21 PROCEDURE — E9220 PRA ENDO ANESTHESIA: HCPCS | Mod: ,,, | Performed by: NURSE ANESTHETIST, CERTIFIED REGISTERED

## 2024-05-21 PROCEDURE — 43239 EGD BIOPSY SINGLE/MULTIPLE: CPT | Mod: ,,, | Performed by: INTERNAL MEDICINE

## 2024-05-21 PROCEDURE — 37000008 HC ANESTHESIA 1ST 15 MINUTES: Performed by: INTERNAL MEDICINE

## 2024-05-21 PROCEDURE — 27201012 HC FORCEPS, HOT/COLD, DISP: Performed by: INTERNAL MEDICINE

## 2024-05-21 PROCEDURE — 88305 TISSUE EXAM BY PATHOLOGIST: CPT | Performed by: PATHOLOGY

## 2024-05-21 PROCEDURE — 37000009 HC ANESTHESIA EA ADD 15 MINS: Performed by: INTERNAL MEDICINE

## 2024-05-21 RX ORDER — PROPOFOL 10 MG/ML
VIAL (ML) INTRAVENOUS CONTINUOUS PRN
Status: DISCONTINUED | OUTPATIENT
Start: 2024-05-21 | End: 2024-05-21

## 2024-05-21 RX ORDER — PROPOFOL 10 MG/ML
VIAL (ML) INTRAVENOUS
Status: DISCONTINUED | OUTPATIENT
Start: 2024-05-21 | End: 2024-05-21

## 2024-05-21 RX ORDER — LIDOCAINE HYDROCHLORIDE 20 MG/ML
INJECTION INTRAVENOUS
Status: DISCONTINUED | OUTPATIENT
Start: 2024-05-21 | End: 2024-05-21

## 2024-05-21 RX ORDER — SODIUM CHLORIDE 9 MG/ML
INJECTION, SOLUTION INTRAVENOUS CONTINUOUS
Status: DISCONTINUED | OUTPATIENT
Start: 2024-05-21 | End: 2024-05-21 | Stop reason: HOSPADM

## 2024-05-21 RX ADMIN — LIDOCAINE HYDROCHLORIDE 80 MG: 20 INJECTION INTRAVENOUS at 09:05

## 2024-05-21 RX ADMIN — PROPOFOL 80 MG: 10 INJECTION, EMULSION INTRAVENOUS at 09:05

## 2024-05-21 RX ADMIN — SODIUM CHLORIDE: 0.9 INJECTION, SOLUTION INTRAVENOUS at 09:05

## 2024-05-21 RX ADMIN — PROPOFOL 175 MCG/KG/MIN: 10 INJECTION, EMULSION INTRAVENOUS at 09:05

## 2024-05-21 NOTE — ANESTHESIA POSTPROCEDURE EVALUATION
Anesthesia Post Evaluation    Patient: Rafael Smith    Procedure(s) Performed: Procedure(s) (LRB):  EGD (ESOPHAGOGASTRODUODENOSCOPY) (N/A)    Final Anesthesia Type: general      Patient location during evaluation: GI PACU  Patient participation: Yes- Able to Participate  Level of consciousness: awake and alert and oriented  Post-procedure vital signs: reviewed and stable  Pain management: adequate  Airway patency: patent    PONV status at discharge: No PONV  Anesthetic complications: no      Cardiovascular status: stable  Respiratory status: unassisted, spontaneous ventilation and room air  Hydration status: euvolemic  Follow-up not needed.          Vitals Value Taken Time   BP 91/50 05/21/24 0934   Temp 36.8 °C (98.2 °F) 05/21/24 0934   Pulse 64 05/21/24 0934   Resp 16 05/21/24 0934   SpO2 95 % 05/21/24 0934         No case tracking events are documented in the log.      Pain/Dulce Maria Score: Dulce Maria Score: 9 (5/21/2024  9:35 AM)

## 2024-05-21 NOTE — PROVATION PATIENT INSTRUCTIONS
Discharge Summary/Instructions after an Endoscopic Procedure  Patient Name: Rafael Smith  Patient MRN: [id number]  Patient YOB: 1980  Tuesday, May 21, 2024  Michael Gray MD  Dear patient,  As a result of recent federal legislation (The Federal Cures Act), you may   receive lab or pathology results from your procedure in your [facility]   account before your physician is able to contact you. Your physician or   their representative will relay the results to you with their   recommendations at their soonest availability.  Thank you,  RESTRICTIONS:  During your procedure today, you received medications for sedation.  These   medications may affect your judgment, balance and coordination.  Therefore,   for 24 hours, you have the following restrictions:   - DO NOT drive a car, operate machinery, make legal/financial decisions,   sign important papers or drink alcohol.    ACTIVITY:  Today: no heavy lifting, straining or running due to procedural   sedation/anesthesia.  The following day: return to full activity including work.  DIET:  Eat and drink normally unless instructed otherwise.     TREATMENT FOR COMMON SIDE EFFECTS:  - Mild abdominal pain, nausea, belching, bloating or excessive gas:  rest,   eat lightly and use a heating pad.  - Sore Throat: treat with throat lozenges and/or gargle with warm salt   water.  - Because air was used during the procedure, expelling large amounts of air   from your rectum or belching is normal.  - If a bowel prep was taken, you may not have a bowel movement for 1-3 days.    This is normal.  SYMPTOMS TO WATCH FOR AND REPORT TO YOUR PHYSICIAN:  1. Abdominal pain or bloating, other than gas cramps.  2. Chest pain.  3. Back pain.  4. Signs of infection such as: chills or fever occurring within 24 hours   after the procedure.  5. Rectal bleeding, which would show as bright red, maroon, or black stools.   (A tablespoon of blood from the rectum is not serious, especially if    hemorrhoids are present.)  6. Vomiting.  7. Weakness or dizziness.  GO DIRECTLY TO THE NEAREST EMERGENCY ROOM IF YOU HAVE ANY OF THE FOLLOWING:      Difficulty breathing              Chills and/or fever over 101 F   Persistent vomiting and/or vomiting blood   Severe abdominal pain   Severe chest pain   Black, tarry stools   Bleeding- more than one tablespoon   Any other symptom or condition that you feel may need urgent attention  Your doctor recommends these additional instructions:  If any biopsies were taken, your doctors clinic will contact you in 1 to 2   weeks with any results.  - Discharge patient to home.   - Follow an antireflux regimen indefinitely.   - Await pathology results.   - Telephone endoscopist for pathology results in 3 weeks.   - Repeat upper endoscopy in 3 years for surveillance based on pathology   results.   - Return to GI clinic at the next available appointment.   - Continue Aciphex (rabeprazole) 20 mg by mouth once daily before Breakfast.     - The findings and recommendations were discussed with the patient.  For questions, problems or results please call your physician - Michael Gray MD at Work:  [phone].  [facility], EMERGENCY ROOM PHONE NUMBER: [phone]  IF A COMPLICATION OR EMERGENCY SITUATION ARISES AND YOU ARE UNABLE TO REACH   YOUR PHYSICIAN - GO DIRECTLY TO THE EMERGENCY ROOM.  Michael Gray MD  5/21/2024 9:48:05 AM  This report has been verified and signed electronically.  Dear patient,  As a result of recent federal legislation (The Federal Cures Act), you may   receive lab or pathology results from your procedure in your [facility]   account before your physician is able to contact you. Your physician or   their representative will relay the results to you with their   recommendations at their soonest availability.  Thank you,  PROVATION

## 2024-05-21 NOTE — H&P
[facility] 4th Floor  History & Physical    Subjective:      Chief Complaint/Reason for Admission:     EGD for slipped fundoplication and Diagnosis: GERD, left upper quadrant pain     Rafael Smith is a 43 y.o. male.    Past Medical History:   Diagnosis Date    Anxiety     Depression     GERD (gastroesophageal reflux disease)      Past Surgical History:   Procedure Laterality Date    APPENDECTOMY      GASTRIC FUNDOPLICATION  04/01/2021     Social History     Tobacco Use    Smoking status: Never    Smokeless tobacco: Never   Substance Use Topics    Alcohol use: Never    Drug use: Never       PTA Medications   Medication Sig    buPROPion (WELLBUTRIN XL) 300 MG 24 hr tablet Take 300 mg by mouth once daily.    omeprazole 20 mg TbEC Omeprazole    RABEprazole (ACIPHEX) 20 mg tablet Take 20 mg by mouth.     Review of patient's allergies indicates:  No Known Allergies     Review of Systems   Constitutional:  Negative for fever.       Objective:      Vital Signs (Most Recent)  Temp: 98.1 °F (36.7 °C) (05/21/24 0856)  Pulse: (!) 57 (05/21/24 0856)  Resp: 18 (05/21/24 0856)  BP: 108/72 (05/21/24 0856)  SpO2: 100 % (05/21/24 0856)    Vital Signs Range (Last 24H):  Temp:  [98.1 °F (36.7 °C)]   Pulse:  [57]   Resp:  [18]   BP: (108)/(72)   SpO2:  [100 %]     Physical Exam  Cardiovascular:      Rate and Rhythm: Bradycardia present.   Pulmonary:      Effort: Pulmonary effort is normal.   Neurological:      Mental Status: He is alert and oriented to person, place, and time.             Assessment:    EGD for slipped fundoplication and Diagnosis: GERD, left upper quadrant pain       Plan:    EGD for slipped fundoplication and Diagnosis: GERD, left upper quadrant pain

## 2024-05-21 NOTE — TRANSFER OF CARE
Anesthesia Transfer of Care Note    Patient: Rafael Smith    Procedure(s) Performed: Procedure(s) (LRB):  EGD (ESOPHAGOGASTRODUODENOSCOPY) (N/A)    Patient location: PACU    Anesthesia Type: general    Transport from OR: Transported from OR on room air with adequate spontaneous ventilation    Post pain: adequate analgesia    Post assessment: no apparent anesthetic complications and tolerated procedure well    Post vital signs: stable    Level of consciousness: awake, alert and oriented    Nausea/Vomiting: no nausea/vomiting    Complications: none    Transfer of care protocol was followed      Last vitals: Visit Vitals  BP (!) 91/50 (BP Location: Left arm)   Pulse 64   Temp 36.8 °C (98.2 °F) (Temporal)   Resp 16   Ht 6' (1.829 m)   Wt 90.7 kg (200 lb)   SpO2 95%   BMI 27.12 kg/m²

## 2024-05-21 NOTE — ANESTHESIA PREPROCEDURE EVALUATION
05/21/2024  Rafael Smith is a 43 y.o., male.  Pre-operative evaluation for Procedure(s) (LRB):  EGD (ESOPHAGOGASTRODUODENOSCOPY) (N/A)    Rafael Smith is a 43 y.o. male     There is no problem list on file for this patient.      Review of patient's allergies indicates:  No Known Allergies    No current facility-administered medications on file prior to encounter.     Current Outpatient Medications on File Prior to Encounter   Medication Sig Dispense Refill    buPROPion (WELLBUTRIN XL) 300 MG 24 hr tablet Take 300 mg by mouth once daily.      omeprazole 20 mg TbEC Omeprazole      RABEprazole (ACIPHEX) 20 mg tablet Take 20 mg by mouth.         Past Surgical History:   Procedure Laterality Date    APPENDECTOMY      GASTRIC FUNDOPLICATION  04/01/2021           Pre-op Assessment    I have reviewed the Patient Summary Reports.     I have reviewed the Nursing Notes. I have reviewed the NPO Status.   I have reviewed the Medications.     Review of Systems  Anesthesia Hx:  No problems with previous Anesthesia                Cardiovascular:      Denies Hypertension.                                        Pulmonary:     Denies Asthma.                    Hepatic/GI:     GERD             Neurological:  Denies TIA.  Denies CVA.    Denies Seizures.                                Endocrine:  Denies Diabetes. Denies Hypothyroidism.              Physical Exam  General: Cooperative    Airway:  Mallampati: II   Mouth Opening: Normal  TM Distance: Normal  Tongue: Normal  Neck ROM: Normal ROM    Dental:  Intact    Anesthesia Plan  Type of Anesthesia, risks & benefits discussed:    Anesthesia Type: Gen Natural Airway  Intra-op Monitoring Plan: Standard ASA Monitors  Induction:  IV  Informed Consent: Informed consent signed with the Patient and all parties understand the risks and agree with anesthesia plan.  All questions  answered.   ASA Score: 2  Day of Surgery Review of History & Physical: H&P Update referred to the surgeon/provider.    Ready For Surgery From Anesthesia Perspective.   .

## 2024-05-23 ENCOUNTER — OFFICE VISIT (OUTPATIENT)
Dept: SURGERY | Facility: CLINIC | Age: 44
End: 2024-05-23
Payer: COMMERCIAL

## 2024-05-23 VITALS
HEIGHT: 72 IN | SYSTOLIC BLOOD PRESSURE: 130 MMHG | BODY MASS INDEX: 27.21 KG/M2 | WEIGHT: 200.94 LBS | HEART RATE: 67 BPM | DIASTOLIC BLOOD PRESSURE: 80 MMHG

## 2024-05-23 DIAGNOSIS — K91.89 SLIPPED NISSEN FUNDOPLICATION: ICD-10-CM

## 2024-05-23 DIAGNOSIS — K21.9 GASTROESOPHAGEAL REFLUX DISEASE, UNSPECIFIED WHETHER ESOPHAGITIS PRESENT: ICD-10-CM

## 2024-05-23 DIAGNOSIS — K44.9 HIATAL HERNIA: ICD-10-CM

## 2024-05-23 PROCEDURE — 3075F SYST BP GE 130 - 139MM HG: CPT | Mod: CPTII,S$GLB,, | Performed by: SURGERY

## 2024-05-23 PROCEDURE — 1159F MED LIST DOCD IN RCRD: CPT | Mod: CPTII,S$GLB,, | Performed by: SURGERY

## 2024-05-23 PROCEDURE — 1160F RVW MEDS BY RX/DR IN RCRD: CPT | Mod: CPTII,S$GLB,, | Performed by: SURGERY

## 2024-05-23 PROCEDURE — 99204 OFFICE O/P NEW MOD 45 MIN: CPT | Mod: S$GLB,,, | Performed by: SURGERY

## 2024-05-23 PROCEDURE — 3008F BODY MASS INDEX DOCD: CPT | Mod: CPTII,S$GLB,, | Performed by: SURGERY

## 2024-05-23 PROCEDURE — 3079F DIAST BP 80-89 MM HG: CPT | Mod: CPTII,S$GLB,, | Performed by: SURGERY

## 2024-05-23 PROCEDURE — 99999 PR PBB SHADOW E&M-EST. PATIENT-LVL IV: CPT | Mod: PBBFAC,,, | Performed by: SURGERY

## 2024-05-23 NOTE — PROGRESS NOTES
General Surgery Clinic  History and Physical    Subjective:     Rafael Smith is a 43 y.o. male with h/o fundoplication (4/1/2021),  who presents to clinic for reflux and a slipped fundoplication. Has had reflux for a while then got fundoplication and felt like it didn't help. Has tried many PPIs (pantoprazoole, omeprazole, Rabeprazole). Rabeprazole (20mg, once a day) has helped some. Tums does help a bit too. Chest pain and reflux. Heartburn every other day and feels like he needs to clear his throat daily. Symptoms are noticed when he wakes up, sometimes late evening and sometimes all day. Sleeps on angled bed. Has stopped drinking coffee and soda. Appetite is normal. Has been constipated for about a month. Referred by Dr. Bailon (GI)      GERD Questionnaire  (has)  Meds: Rabeprazole, TUMs  has Typical heartburn  denies Regurgitation  denies Dysphagia solids  denies Dysphagia liquids  denies Hoarseness, 1x/month  has Sore throat, 1x/wk  denies Cough  denies Asthma  has Chest pain  Has Water brash, 2x/week  has Globus  denies Nausea  denies Vomiting        PMH:   Past Medical History:   Diagnosis Date    Anxiety     Depression     GERD (gastroesophageal reflux disease)        Past Surgical History:   Past Surgical History:   Procedure Laterality Date    APPENDECTOMY      ESOPHAGOGASTRODUODENOSCOPY N/A 5/21/2024    Procedure: EGD (ESOPHAGOGASTRODUODENOSCOPY);  Surgeon: Michael Bailon MD;  Location: [address]);  Service: Endoscopy;  Laterality: N/A;  dr bailon pt / prep instructions sent to pt via portal / pt declined colon only wants egd-  5/15-pre call complete-tbhis is to inform you that case with appointment  05/21/2024 got no prior authorization is required for the service requested. Ther    GASTRIC FUNDOPLICATION  04/01/2021       Social History:  Social History     Socioeconomic History    Marital status:    Occupational History    Occupation: Teacher     Comment: high school - [employer]    Tobacco Use    Smoking status: Never    Smokeless tobacco: Never   Substance and Sexual Activity    Alcohol use: Not Currently     Comment: Not in 4 years    Drug use: Never    Sexual activity: Yes     Partners: Female     Birth control/protection: None     Comment: has vasectomy     Social Determinants of Health     Financial Resource Strain: Low Risk  (1/16/2024)    Overall Financial Resource Strain (CARDIA)     Difficulty of Paying Living Expenses: Not hard at all   Food Insecurity: No Food Insecurity (1/16/2024)    Hunger Vital Sign     Worried About Running Out of Food in the Last Year: Never true     Ran Out of Food in the Last Year: Never true   Transportation Needs: No Transportation Needs (1/16/2024)    PRAPARE - Transportation     Lack of Transportation (Medical): No     Lack of Transportation (Non-Medical): No   Physical Activity: Insufficiently Active (1/16/2024)    Exercise Vital Sign     Days of Exercise per Week: 1 day     Minutes of Exercise per Session: 30 min   Stress: Stress Concern Present (1/16/2024)    [employer] [place] [employer] - Occupational Stress Questionnaire     Feeling of Stress : To some extent   Housing Stability: Low Risk  (1/16/2024)    Housing Stability Vital Sign     Unable to Pay for Housing in the Last Year: No     Number of Places Lived in the Last Year: 1     Unstable Housing in the Last Year: No       Allergies: Review of patient's allergies indicates:  No Known Allergies    Medications:    Current Outpatient Medications on File Prior to Visit   Medication Sig Dispense Refill    ascorbic acid (VITAMIN C ORAL) Take by mouth.      buPROPion (WELLBUTRIN XL) 300 MG 24 hr tablet Take 300 mg by mouth once daily.      calcium carbonate (CALCIUM 300 ORAL) Take by mouth.      cyanocobalamin, vitamin B-12, (VITAMIN B-12 ORAL) Take by mouth.      Lactobacillus acidophilus (PROBIOTIC ORAL) Take by mouth.      MAGNESIUM ORAL Take by mouth.      RABEprazole (ACIPHEX) 20 mg  tablet Take 20 mg by mouth.       No current facility-administered medications on file prior to visit.         Objective:     PHYSICAL EXAM:  Vital Signs (Most Recent)  Pulse: 67 (05/23/24 1302)  BP: 130/80 (05/23/24 1302)    ROS A 10+ review of systems was performed with pertinent positives and negatives noted above in the history of present illness.  Other systems were negative unless otherwise specified.    Physical Exam:  Physical Exam  Scars from laparoscopic fundoplication in 2021  Labs:  I have reviewed all pertinent lab results within the past 24 hours.    Imaging  The following imaging was reviewed:     CT enterography 4/11/2024:  Impression:     Slipped fundoplication.     No evidence of active inflammatory bowel disease.    EGD 5/21/2024:  Impression:      - Normal examined duodenum. Biopsied.   - Normal stomach. Biopsied. Loose fundoplication   - 1 cm hiatal hernia. C0M0.5. Biopsied.     Assessment:     43 y.o. male with GERD s/p fundoplication in 2021 at [facility] who presents today for reflux symptoms and referral from GI for a slipped fundoplication and hiatal hernia seen on imaging.    Plan:     - full work-up for reflux. Barium swallow, manometry, and pH monitoring    Arjun Alvarez, MS3  [employer]

## 2024-05-23 NOTE — PROGRESS NOTES
I have seen the patient, reviewed the Student's history and physical, assessment and plan. I have personally interviewed and examined the patient at bedside and: agree with the findings.     44y/o with bmi 27, gerd and recurrent hh.  He is s/p laparoscopic fundoplication for gerd symptoms.  He says it didn't help his symptoms.  His main complaint is chest pain and he has tried many medications for it without relief.  Currently he is on ppi daily and despite this he has symptoms of chest pain, heartburn and denies dysphagia, nausea, vomiting and wt loss. He has some constipation recently, denies fevers, sob, urinary trouble and easy bruising and bleeding.    He is an , high school.  He has had cartoons in the [place].    PE abdomen soft, wounds well healed    Cbc, cmp reviewed, ok  Ct reviewed, films viewed, hiatal hernia, dilated stomach (of unknown significance)  Egd reviewed, 1cm hh, loose fundoplication    Recurrent hh.  Obtain old op note, ges, ph and motility studies.  Consider robotic redo hh with possible mesh and fundoplication.

## 2024-05-26 LAB
FINAL PATHOLOGIC DIAGNOSIS: NORMAL
GROSS: NORMAL
Lab: NORMAL

## 2024-05-27 ENCOUNTER — TELEPHONE (OUTPATIENT)
Dept: ENDOSCOPY | Facility: HOSPITAL | Age: 44
End: 2024-05-27
Payer: COMMERCIAL

## 2024-05-27 ENCOUNTER — PATIENT MESSAGE (OUTPATIENT)
Dept: GASTROENTEROLOGY | Facility: CLINIC | Age: 44
End: 2024-05-27
Payer: COMMERCIAL

## 2024-05-27 NOTE — TELEPHONE ENCOUNTER
Contacted the patient to schedule an endoscopy procedure(s) 5/27/24. The patient did not answer the call and left a voice message requesting a call back.

## 2024-05-27 NOTE — TELEPHONE ENCOUNTER
"----- Message from Kristie Man RN sent at 2024  1:50 PM CDT -----  Regarding: Manometry and Bravo pH  Procedure: EGD/Bravo    Diagnosis: Hiatal Hernia     Procedure Timin-12 weeks    #If within 4 weeks selected, please jt as high priority#    #If greater than 12 weeks, please select "5-12 weeks" and delay sending until 3 months prior to requested date#       Additional Scheduling Information: Needs Manometry and Bravo pH - sooner rather than later    Is the patient taking a GLP-1 Agonist and/or blood thinner :no    Have you attached a patient to this message: yes  "

## 2024-05-27 NOTE — PROGRESS NOTES
Rafale your EGD pathology was benign no celiac sprue no H pylori no Barretts esophagus recommend your next surveillance EGD 3 years.      1. DUODENAL BIOPSIES:  -  No significant histopathologic abnormality; there is no evidence of celiac disease.      2. STOMACH BIOPSIES:  -  Chemical gastritis / reactive gastropathy with mild to moderate chronic inflammation.  -  Helicobacter pylori are not identified on routine staining.  -  No evidence of intestinal metaplasia, dysplasia or malignancy.      3. DISTAL ESOPHAGEAL BIOPSY:  -  Focal superficial keratohyaline granules; suggestive of previous mucosal injury.  -  No glandular mucosa or gastroesophageal junction is present for evaluation.  -  No evidence of dysplasia or malignancy.     Comment: Interp By Blanca Delaney MD, Signed on 05/26/2024

## 2024-05-29 ENCOUNTER — TELEPHONE (OUTPATIENT)
Dept: ENDOSCOPY | Facility: HOSPITAL | Age: 44
End: 2024-05-29
Payer: COMMERCIAL

## 2024-05-29 NOTE — TELEPHONE ENCOUNTER
Contacted the patient to schedule an endoscopy procedure(s) 5/29/24. The patient did not answer the call and left a voice message requesting a call back    2nd attempt to schedule procedure

## 2024-05-31 ENCOUNTER — TELEPHONE (OUTPATIENT)
Dept: SURGERY | Facility: CLINIC | Age: 44
End: 2024-05-31
Payer: COMMERCIAL

## 2024-05-31 NOTE — TELEPHONE ENCOUNTER
Tried calling the patient back, no answer.    ----- Message from Mor Oneil sent at 5/31/2024 12:57 PM CDT -----  Regarding: Missed call  Contact: [phone]  Caller is returning a missed called from office. He said he plan to cancel the appointment he has decided not to do the procedure. Please call back as soon as possible.

## 2024-06-03 ENCOUNTER — TELEPHONE (OUTPATIENT)
Dept: SURGERY | Facility: CLINIC | Age: 44
End: 2024-06-03
Payer: COMMERCIAL

## 2024-06-03 NOTE — TELEPHONE ENCOUNTER
----- Message from Davian Schmitt MD sent at 5/31/2024  3:32 PM CDT -----  Regarding: RE: Procedure Cancellation  He needs the ges and we could do a timed bas with tablet and if they are both ok proceed.  He should know that the TBS with tablet is not scientifically validated to replace motility but if it is ok it likely would be ok.  Otherwise I don't want to do his procedure.  ----- Message -----  From: Shanique White MA  Sent: 5/31/2024   2:25 PM CDT  To: Davian Schmitt MD; #  Subject: Procedure Cancellation                           This patient has decided against the gastric emptying, Bravo and Manometry procedures.  I have tried calling him but have been unsuccessful.  Nanci

## 2024-06-12 ENCOUNTER — TELEPHONE (OUTPATIENT)
Dept: ENDOSCOPY | Facility: HOSPITAL | Age: 44
End: 2024-06-12
Payer: COMMERCIAL

## 2024-06-12 NOTE — TELEPHONE ENCOUNTER
Contacted the patient to schedule an endoscopy procedure(s) 5/27,5/29 and 6/12. The patient did not answer the call and left a voice message requesting a call back.

## 2024-06-12 NOTE — TELEPHONE ENCOUNTER
ERNIE for patient to call me back at [phone] re: endoscopy is trying to contact him to schedule his Bravo procedure.  He needs to call us.

## 2025-03-25 ENCOUNTER — OFFICE VISIT (OUTPATIENT)
Dept: SLEEP MEDICINE | Facility: CLINIC | Age: 45
End: 2025-03-25
Payer: COMMERCIAL

## 2025-03-25 VITALS
WEIGHT: 213.19 LBS | SYSTOLIC BLOOD PRESSURE: 130 MMHG | HEIGHT: 73 IN | HEART RATE: 71 BPM | DIASTOLIC BLOOD PRESSURE: 84 MMHG | BODY MASS INDEX: 28.25 KG/M2

## 2025-03-25 DIAGNOSIS — F51.09 OTHER INSOMNIA NOT DUE TO A SUBSTANCE OR KNOWN PHYSIOLOGICAL CONDITION: ICD-10-CM

## 2025-03-25 DIAGNOSIS — R35.1 NOCTURIA: ICD-10-CM

## 2025-03-25 DIAGNOSIS — R06.83 SNORING: Primary | ICD-10-CM

## 2025-03-25 DIAGNOSIS — G47.19 EXCESSIVE DAYTIME SLEEPINESS: ICD-10-CM

## 2025-03-25 PROBLEM — E78.00 ELEVATED LDL CHOLESTEROL LEVEL: Status: ACTIVE | Noted: 2024-05-30

## 2025-03-25 PROBLEM — F10.11 NONDEPENDENT ALCOHOL ABUSE, IN REMISSION: Status: ACTIVE | Noted: 2021-06-10

## 2025-03-25 PROBLEM — F41.8 DEPRESSION WITH ANXIETY: Status: ACTIVE | Noted: 2024-05-30

## 2025-03-25 PROBLEM — K21.00 GASTRO-ESOPHAGEAL REFLUX DISEASE WITH ESOPHAGITIS: Status: ACTIVE | Noted: 2024-05-30

## 2025-03-25 PROBLEM — F33.41 MAJOR DEPRESSIVE DISORDER, RECURRENT, IN PARTIAL REMISSION: Status: ACTIVE | Noted: 2021-06-10

## 2025-03-25 PROCEDURE — 99204 OFFICE O/P NEW MOD 45 MIN: CPT | Mod: S$GLB,,, | Performed by: PHYSICIAN ASSISTANT

## 2025-03-25 PROCEDURE — 1160F RVW MEDS BY RX/DR IN RCRD: CPT | Mod: CPTII,S$GLB,, | Performed by: PHYSICIAN ASSISTANT

## 2025-03-25 PROCEDURE — 1159F MED LIST DOCD IN RCRD: CPT | Mod: CPTII,S$GLB,, | Performed by: PHYSICIAN ASSISTANT

## 2025-03-25 PROCEDURE — 99999 PR PBB SHADOW E&M-EST. PATIENT-LVL III: CPT | Mod: PBBFAC,,, | Performed by: PHYSICIAN ASSISTANT

## 2025-03-25 PROCEDURE — 3075F SYST BP GE 130 - 139MM HG: CPT | Mod: CPTII,S$GLB,, | Performed by: PHYSICIAN ASSISTANT

## 2025-03-25 PROCEDURE — 3079F DIAST BP 80-89 MM HG: CPT | Mod: CPTII,S$GLB,, | Performed by: PHYSICIAN ASSISTANT

## 2025-03-25 PROCEDURE — 3008F BODY MASS INDEX DOCD: CPT | Mod: CPTII,S$GLB,, | Performed by: PHYSICIAN ASSISTANT

## 2025-03-25 NOTE — PROGRESS NOTES
"Referred by Ramu Espinoza MD     NEW PATIENT VISIT    Rafael Smith  is a pleasant 44 y.o. male  with PMH significant for GERD, anxiety, depression who presents for sleep evaluation following referral from PCP      C/o snoring, snoring arousals, witnessed apneas, poor disrupted and un-refreshing sleep, and excessive daytime sleepiness and fatigue. Denies drowsiness when driving.  Denies sleep walking/talking. Denies dream enactment behavior. Denies sx of RLS. States PCP recommended evaluation for LUANN, which is why he presents today    SLEEP SCHEDULE   Environment    Bed Time 9-11PM   Sleep Latency 5-10mins   Arousals 4   Nocturia 1   Back to sleep 3mins   Wake time 5:30AM work day  6-9AM off day   Naps Occasional nap    Work          3/24/2025     4:52 PM   Sleep Clinic ROS    Difficulty breathing through the nose?  Yes   Sore throat or dry mouth in the morning? Yes   Irregular or very fast heart beat?  No   Shortness of breath?  No   Acid reflux? Yes   Body aches and pains?  No   Morning headaches? No   Dizziness? No   Mood changes?  Yes   Do you exercise?  No   Do you feel like moving your legs a lot?  No       Past Medical History:   Diagnosis Date    Anxiety     Depression     GERD (gastroesophageal reflux disease)      Problem List[1]  Current Medications[2]       Vitals:    03/25/25 1427   BP: 130/84   BP Location: Left arm   Patient Position: Sitting   Pulse: 71   Weight: 96.7 kg (213 lb 3 oz)   Height: 6' 1" (1.854 m)     Physical Exam:    GEN:   Well-appearing  Psych:  Appropriate affect, demonstrates insight  SKIN:  No rash on the face or bridge of the nose      LABS:   Lab Results   Component Value Date    HGB 14.9 12/23/2024    CO2 31 12/23/2024         RECORDS REVIEWED:    No previous sleep study    ASSESSMENT        3/24/2025     4:47 PM   EPWORTH SLEEPINESS SCALE   Sitting and reading 1   Watching TV 1   Sitting, inactive in a public place (e.g. a theatre or a meeting) 0   As a passenger in a " car for an hour without a break 0   Lying down to rest in the afternoon when circumstances permit 3   Sitting and talking to someone 0   Sitting quietly after a lunch without alcohol 0   In a car, while stopped for a few minutes in traffic 0   Total score 5        Patient-reported       PROBLEM DESCRIPTION/ Sx on Presentation  STATUS   Sx LUANN   + snoring, + snoring arousals, + witnessed apneas  + wakes with sore throat occasionally  + wakes feeling un-refreshed   Denies AM headaches  Father snored but unsure if dx with LUANN  New   Daytime Sx   + sleepiness when inactive   Occasional nap  Denies drowsiness when driving   ESS 5/24 on intake  New   Insomnia   Trouble falling asleep: no issues  Arousals:         4  Hard to get back to sleep?: no issues    Prior pertinent medications:  Current pertinent medications:   New   Nocturia   x 1 per sleep period  New   Other issues:       PLAN      -recommend sleep testing   -HST ordered  -discussed trial therapy if LUANN present and the patient is open to a trial of CPAP therapy  -discussed LUANN and PAP with patient in detail, including possible complications of untreated LUANN like heart attack/stroke  -advised on strict driving precautions; advised never to drive drowsy     Advised on plan of care. Answered all patient questions. Patient verbalized understanding and voiced agreement with plan of care.     RTC if dx of LUANN made and CPAP ordered, will need follow up 31-90 days after receiving machine for compliance         The patient was given open opportunity to ask questions and/or express concerns about treatment plan. All questions/concerns were discussed.     Two patient identifiers used prior to evaluation.                  [1]   Patient Active Problem List  Diagnosis    Hiatal hernia    Depression with anxiety    Elevated LDL cholesterol level    Gastro-esophageal reflux disease with esophagitis    Major depressive disorder, recurrent, in partial remission    Nondependent  alcohol abuse, in remission   [2]   Current Outpatient Medications:     buPROPion (WELLBUTRIN XL) 300 MG 24 hr tablet, Take 300 mg by mouth once daily., Disp: , Rfl:     RABEprazole (ACIPHEX) 20 mg tablet, Take 20 mg by mouth., Disp: , Rfl:     ascorbic acid (VITAMIN C ORAL), Take by mouth., Disp: , Rfl:     calcium carbonate (CALCIUM 300 ORAL), Take by mouth., Disp: , Rfl:     cyanocobalamin, vitamin B-12, (VITAMIN B-12 ORAL), Take by mouth., Disp: , Rfl:     Lactobacillus acidophilus (PROBIOTIC ORAL), Take by mouth., Disp: , Rfl:     MAGNESIUM ORAL, Take by mouth., Disp: , Rfl:

## 2025-04-07 ENCOUNTER — TELEPHONE (OUTPATIENT)
Dept: SLEEP MEDICINE | Facility: HOSPITAL | Age: 45
End: 2025-04-07
Payer: COMMERCIAL

## 2025-04-16 ENCOUNTER — HOSPITAL ENCOUNTER (OUTPATIENT)
Dept: SLEEP MEDICINE | Facility: HOSPITAL | Age: 45
Discharge: HOME | End: 2025-04-16
Payer: COMMERCIAL

## 2025-04-16 DIAGNOSIS — G47.19 EXCESSIVE DAYTIME SLEEPINESS: ICD-10-CM

## 2025-04-16 DIAGNOSIS — R35.1 NOCTURIA: ICD-10-CM

## 2025-04-16 DIAGNOSIS — F51.09 OTHER INSOMNIA NOT DUE TO A SUBSTANCE OR KNOWN PHYSIOLOGICAL CONDITION: ICD-10-CM

## 2025-04-16 DIAGNOSIS — R06.83 SNORING: ICD-10-CM

## 2025-04-16 PROCEDURE — 95800 SLP STDY UNATTENDED: CPT

## 2025-04-17 PROBLEM — R06.83 SNORING: Status: ACTIVE | Noted: 2025-04-17

## 2025-04-24 ENCOUNTER — PATIENT MESSAGE (OUTPATIENT)
Dept: SLEEP MEDICINE | Facility: CLINIC | Age: 45
End: 2025-04-24
Payer: COMMERCIAL

## 2025-04-24 DIAGNOSIS — G47.33 OSA (OBSTRUCTIVE SLEEP APNEA): Primary | ICD-10-CM

## 2025-05-30 ENCOUNTER — TELEPHONE (OUTPATIENT)
Dept: SLEEP MEDICINE | Facility: CLINIC | Age: 45
End: 2025-05-30
Payer: COMMERCIAL

## 2025-05-30 NOTE — TELEPHONE ENCOUNTER
Staff left message to schedule patient compliance appointment.         Copied from CRM #[id number]. Topic: General Inquiry - Patient Advice  >> May 30, 2025  9:19 AM Alec wrote:  Type: Patient Call Back    Who called: Patient    What is the request in detail: Patient stated he started using his machine last night and want to make an appointment 30 to 90 day follow up     Can the clinic reply by [facility]? both    Would the patient rather a call back or a response via [facility]? call    Best call back number:[phone]    Additional Information:

## 2025-08-25 ENCOUNTER — OFFICE VISIT (OUTPATIENT)
Dept: SLEEP MEDICINE | Facility: CLINIC | Age: 45
End: 2025-08-25
Payer: COMMERCIAL

## 2025-08-25 VITALS — HEIGHT: 73 IN | BODY MASS INDEX: 28.05 KG/M2 | WEIGHT: 211.63 LBS

## 2025-08-25 DIAGNOSIS — G47.19 EXCESSIVE DAYTIME SLEEPINESS: ICD-10-CM

## 2025-08-25 DIAGNOSIS — R09.81 NASAL CONGESTION: ICD-10-CM

## 2025-08-25 DIAGNOSIS — G47.33 OSA (OBSTRUCTIVE SLEEP APNEA): Primary | ICD-10-CM

## 2025-08-25 DIAGNOSIS — F51.09 OTHER INSOMNIA NOT DUE TO A SUBSTANCE OR KNOWN PHYSIOLOGICAL CONDITION: ICD-10-CM

## 2025-08-25 PROCEDURE — 1159F MED LIST DOCD IN RCRD: CPT | Mod: CPTII,S$GLB,, | Performed by: PHYSICIAN ASSISTANT

## 2025-08-25 PROCEDURE — 99214 OFFICE O/P EST MOD 30 MIN: CPT | Mod: S$GLB,,, | Performed by: PHYSICIAN ASSISTANT

## 2025-08-25 PROCEDURE — 3008F BODY MASS INDEX DOCD: CPT | Mod: CPTII,S$GLB,, | Performed by: PHYSICIAN ASSISTANT

## 2025-08-25 PROCEDURE — 99999 PR PBB SHADOW E&M-EST. PATIENT-LVL III: CPT | Mod: PBBFAC,,, | Performed by: PHYSICIAN ASSISTANT

## 2025-08-25 PROCEDURE — 1160F RVW MEDS BY RX/DR IN RCRD: CPT | Mod: CPTII,S$GLB,, | Performed by: PHYSICIAN ASSISTANT

## 2025-08-25 PROCEDURE — G2211 COMPLEX E/M VISIT ADD ON: HCPCS | Mod: S$GLB,,, | Performed by: PHYSICIAN ASSISTANT

## 2025-08-25 RX ORDER — AZELASTINE 1 MG/ML
1 SPRAY, METERED NASAL 2 TIMES DAILY
Qty: 30 ML | Refills: 3 | Status: SHIPPED | OUTPATIENT
Start: 2025-08-25 | End: 2026-08-25